# Patient Record
Sex: MALE | Race: ASIAN | NOT HISPANIC OR LATINO | URBAN - METROPOLITAN AREA
[De-identification: names, ages, dates, MRNs, and addresses within clinical notes are randomized per-mention and may not be internally consistent; named-entity substitution may affect disease eponyms.]

---

## 2018-03-20 ENCOUNTER — OUTPATIENT (OUTPATIENT)
Dept: OUTPATIENT SERVICES | Facility: HOSPITAL | Age: 78
LOS: 1 days | End: 2018-03-20
Payer: COMMERCIAL

## 2018-03-20 DIAGNOSIS — R06.00 DYSPNEA, UNSPECIFIED: ICD-10-CM

## 2018-03-20 PROCEDURE — A9502: CPT

## 2018-03-20 PROCEDURE — 93017 CV STRESS TEST TRACING ONLY: CPT

## 2018-03-20 PROCEDURE — 78452 HT MUSCLE IMAGE SPECT MULT: CPT

## 2018-06-04 PROBLEM — Z87.09 HISTORY OF DYSPNEA: Status: RESOLVED | Noted: 2018-06-04 | Resolved: 2018-06-04

## 2018-06-04 PROBLEM — C85.90 LYMPHOMA: Status: ACTIVE | Noted: 2018-06-04

## 2018-06-05 ENCOUNTER — APPOINTMENT (OUTPATIENT)
Dept: INTERVENTIONAL RADIOLOGY/VASCULAR | Facility: CLINIC | Age: 78
End: 2018-06-05
Payer: COMMERCIAL

## 2018-06-05 VITALS
WEIGHT: 150 LBS | HEART RATE: 77 BPM | OXYGEN SATURATION: 100 % | HEIGHT: 64 IN | BODY MASS INDEX: 25.61 KG/M2 | DIASTOLIC BLOOD PRESSURE: 61 MMHG | SYSTOLIC BLOOD PRESSURE: 93 MMHG

## 2018-06-05 VITALS — RESPIRATION RATE: 18 BRPM

## 2018-06-05 DIAGNOSIS — Z87.438 PERSONAL HISTORY OF OTHER DISEASES OF MALE GENITAL ORGANS: ICD-10-CM

## 2018-06-05 DIAGNOSIS — Z86.39 PERSONAL HISTORY OF OTHER ENDOCRINE, NUTRITIONAL AND METABOLIC DISEASE: ICD-10-CM

## 2018-06-05 DIAGNOSIS — Z00.00 ENCOUNTER FOR GENERAL ADULT MEDICAL EXAMINATION W/OUT ABNORMAL FINDINGS: ICD-10-CM

## 2018-06-05 DIAGNOSIS — Z87.891 PERSONAL HISTORY OF NICOTINE DEPENDENCE: ICD-10-CM

## 2018-06-05 DIAGNOSIS — Z86.79 PERSONAL HISTORY OF OTHER DISEASES OF THE CIRCULATORY SYSTEM: ICD-10-CM

## 2018-06-05 DIAGNOSIS — Z87.09 PERSONAL HISTORY OF OTHER DISEASES OF THE RESPIRATORY SYSTEM: ICD-10-CM

## 2018-06-05 DIAGNOSIS — C85.90 NON-HODGKIN LYMPHOMA, UNSPECIFIED, UNSPECIFIED SITE: ICD-10-CM

## 2018-06-05 DIAGNOSIS — Z78.9 OTHER SPECIFIED HEALTH STATUS: ICD-10-CM

## 2018-06-05 DIAGNOSIS — K76.9 LIVER DISEASE, UNSPECIFIED: ICD-10-CM

## 2018-06-05 PROCEDURE — 99204 OFFICE O/P NEW MOD 45 MIN: CPT

## 2018-06-05 RX ORDER — APIXABAN 5 MG/1
5 TABLET, FILM COATED ORAL
Refills: 0 | Status: ACTIVE | COMMUNITY

## 2018-06-05 RX ORDER — PREDNISOLONE ACETATE 10 MG/ML
1 SUSPENSION/ DROPS OPHTHALMIC
Refills: 0 | Status: ACTIVE | COMMUNITY

## 2018-06-05 RX ORDER — KETOROLAC TROMETHAMINE 5 MG/ML
0.5 SOLUTION/ DROPS OPHTHALMIC
Refills: 0 | Status: ACTIVE | COMMUNITY

## 2018-06-05 RX ORDER — TAMSULOSIN HYDROCHLORIDE 0.4 MG/1
0.4 CAPSULE ORAL
Refills: 0 | Status: ACTIVE | COMMUNITY

## 2018-06-05 RX ORDER — IBUPROFEN 200 MG
600 CAPSULE ORAL
Refills: 0 | Status: ACTIVE | COMMUNITY

## 2018-06-05 RX ORDER — RAMIPRIL 5 MG/1
5 CAPSULE ORAL
Refills: 0 | Status: ACTIVE | COMMUNITY

## 2018-06-05 RX ORDER — OXYCODONE 5 MG/1
5 TABLET ORAL
Refills: 0 | Status: ACTIVE | COMMUNITY

## 2018-06-05 RX ORDER — METOPROLOL TARTRATE 100 MG/1
100 TABLET, FILM COATED ORAL
Refills: 0 | Status: ACTIVE | COMMUNITY

## 2018-06-05 RX ORDER — LEVOTHYROXINE SODIUM 50 UG/1
50 TABLET ORAL
Refills: 0 | Status: ACTIVE | COMMUNITY

## 2018-06-05 RX ORDER — MORPHINE SULFATE 15 MG/1
15 TABLET ORAL
Refills: 0 | Status: ACTIVE | COMMUNITY

## 2018-06-05 RX ORDER — INSULIN ASPART INJECTION 100 [IU]/ML
100 INJECTION, SOLUTION SUBCUTANEOUS
Refills: 0 | Status: ACTIVE | COMMUNITY

## 2018-06-05 RX ORDER — ENOXAPARIN SODIUM 100 MG/ML
100 INJECTION SUBCUTANEOUS
Refills: 0 | Status: ACTIVE | COMMUNITY

## 2018-06-05 RX ORDER — GABAPENTIN 300 MG/1
300 CAPSULE ORAL
Refills: 0 | Status: ACTIVE | COMMUNITY

## 2018-06-05 RX ORDER — ATORVASTATIN CALCIUM 80 MG/1
80 TABLET, FILM COATED ORAL
Refills: 0 | Status: ACTIVE | COMMUNITY

## 2018-06-06 ENCOUNTER — OUTPATIENT (OUTPATIENT)
Dept: OUTPATIENT SERVICES | Facility: HOSPITAL | Age: 78
LOS: 1 days | End: 2018-06-06

## 2018-06-06 ENCOUNTER — INPATIENT (INPATIENT)
Facility: HOSPITAL | Age: 78
LOS: 6 days | Discharge: HOME HEALTH | End: 2018-06-13
Attending: INTERNAL MEDICINE | Admitting: INTERNAL MEDICINE
Payer: COMMERCIAL

## 2018-06-06 VITALS
HEART RATE: 80 BPM | TEMPERATURE: 98 F | RESPIRATION RATE: 20 BRPM | SYSTOLIC BLOOD PRESSURE: 132 MMHG | OXYGEN SATURATION: 100 % | DIASTOLIC BLOOD PRESSURE: 69 MMHG

## 2018-06-06 DIAGNOSIS — E03.9 HYPOTHYROIDISM, UNSPECIFIED: ICD-10-CM

## 2018-06-06 DIAGNOSIS — I82.90 ACUTE EMBOLISM AND THROMBOSIS OF UNSPECIFIED VEIN: ICD-10-CM

## 2018-06-06 DIAGNOSIS — I10 ESSENTIAL (PRIMARY) HYPERTENSION: ICD-10-CM

## 2018-06-06 DIAGNOSIS — E87.8 OTHER DISORDERS OF ELECTROLYTE AND FLUID BALANCE, NOT ELSEWHERE CLASSIFIED: ICD-10-CM

## 2018-06-06 DIAGNOSIS — K76.9 LIVER DISEASE, UNSPECIFIED: ICD-10-CM

## 2018-06-06 DIAGNOSIS — I25.10 ATHEROSCLEROTIC HEART DISEASE OF NATIVE CORONARY ARTERY WITHOUT ANGINA PECTORIS: ICD-10-CM

## 2018-06-06 DIAGNOSIS — R06.02 SHORTNESS OF BREATH: ICD-10-CM

## 2018-06-06 DIAGNOSIS — R10.9 UNSPECIFIED ABDOMINAL PAIN: ICD-10-CM

## 2018-06-06 DIAGNOSIS — Z29.9 ENCOUNTER FOR PROPHYLACTIC MEASURES, UNSPECIFIED: ICD-10-CM

## 2018-06-06 DIAGNOSIS — E11.9 TYPE 2 DIABETES MELLITUS WITHOUT COMPLICATIONS: ICD-10-CM

## 2018-06-06 DIAGNOSIS — D64.9 ANEMIA, UNSPECIFIED: ICD-10-CM

## 2018-06-06 DIAGNOSIS — C78.7 SECONDARY MALIGNANT NEOPLASM OF LIVER AND INTRAHEPATIC BILE DUCT: ICD-10-CM

## 2018-06-06 LAB
ALBUMIN SERPL ELPH-MCNC: 3.8 G/DL — SIGNIFICANT CHANGE UP (ref 3.3–5)
ALP SERPL-CCNC: 173 U/L — HIGH (ref 40–120)
ALT FLD-CCNC: 36 U/L — SIGNIFICANT CHANGE UP (ref 4–41)
APTT BLD: 26.2 SEC — LOW (ref 27.5–37.4)
AST SERPL-CCNC: 27 U/L — SIGNIFICANT CHANGE UP (ref 4–40)
BASE EXCESS BLDV CALC-SCNC: 3.7 MMOL/L — SIGNIFICANT CHANGE UP
BASOPHILS # BLD AUTO: 0.04 K/UL — SIGNIFICANT CHANGE UP (ref 0–0.2)
BASOPHILS NFR BLD AUTO: 0.3 % — SIGNIFICANT CHANGE UP (ref 0–2)
BILIRUB SERPL-MCNC: 0.6 MG/DL — SIGNIFICANT CHANGE UP (ref 0.2–1.2)
BLOOD GAS VENOUS - CREATININE: 0.92 MG/DL — SIGNIFICANT CHANGE UP (ref 0.5–1.3)
BUN SERPL-MCNC: 29 MG/DL — HIGH (ref 7–23)
BUN SERPL-MCNC: 30 MG/DL — HIGH (ref 7–23)
CALCIUM SERPL-MCNC: 9.7 MG/DL — SIGNIFICANT CHANGE UP (ref 8.4–10.5)
CALCIUM SERPL-MCNC: 9.8 MG/DL — SIGNIFICANT CHANGE UP (ref 8.4–10.5)
CHLORIDE BLDV-SCNC: 95 MMOL/L — LOW (ref 96–108)
CHLORIDE SERPL-SCNC: 87 MMOL/L — LOW (ref 98–107)
CHLORIDE SERPL-SCNC: 88 MMOL/L — LOW (ref 98–107)
CHLORIDE UR-SCNC: 81 MMOL/L — SIGNIFICANT CHANGE UP
CO2 SERPL-SCNC: 26 MMOL/L — SIGNIFICANT CHANGE UP (ref 22–31)
CO2 SERPL-SCNC: 28 MMOL/L — SIGNIFICANT CHANGE UP (ref 22–31)
CREAT ?TM UR-MCNC: 87.28 MG/DL — SIGNIFICANT CHANGE UP
CREAT SERPL-MCNC: 1 MG/DL — SIGNIFICANT CHANGE UP (ref 0.5–1.3)
CREAT SERPL-MCNC: 1.07 MG/DL — SIGNIFICANT CHANGE UP (ref 0.5–1.3)
EOSINOPHIL # BLD AUTO: 0.63 K/UL — HIGH (ref 0–0.5)
EOSINOPHIL NFR BLD AUTO: 4.6 % — SIGNIFICANT CHANGE UP (ref 0–6)
GAS PNL BLDV: 126 MMOL/L — LOW (ref 136–146)
GLUCOSE BLDV-MCNC: 323 — HIGH (ref 70–99)
GLUCOSE SERPL-MCNC: 330 MG/DL — HIGH (ref 70–99)
GLUCOSE SERPL-MCNC: 344 MG/DL — HIGH (ref 70–99)
HCO3 BLDV-SCNC: 27 MMOL/L — SIGNIFICANT CHANGE UP (ref 20–27)
HCT VFR BLD CALC: 35.6 % — LOW (ref 39–50)
HCT VFR BLDV CALC: 37.6 % — LOW (ref 39–51)
HGB BLD-MCNC: 12.2 G/DL — LOW (ref 13–17)
HGB BLDV-MCNC: 12.2 G/DL — LOW (ref 13–17)
IMM GRANULOCYTES # BLD AUTO: 0.07 # — SIGNIFICANT CHANGE UP
IMM GRANULOCYTES NFR BLD AUTO: 0.5 % — SIGNIFICANT CHANGE UP (ref 0–1.5)
INR BLD: 1.18 — HIGH (ref 0.88–1.17)
LACTATE BLDV-MCNC: 1.8 MMOL/L — SIGNIFICANT CHANGE UP (ref 0.5–2)
LIDOCAIN IGE QN: 151.9 U/L — HIGH (ref 7–60)
LYMPHOCYTES # BLD AUTO: 1.89 K/UL — SIGNIFICANT CHANGE UP (ref 1–3.3)
LYMPHOCYTES # BLD AUTO: 13.9 % — SIGNIFICANT CHANGE UP (ref 13–44)
MCHC RBC-ENTMCNC: 27.5 PG — SIGNIFICANT CHANGE UP (ref 27–34)
MCHC RBC-ENTMCNC: 34.3 % — SIGNIFICANT CHANGE UP (ref 32–36)
MCV RBC AUTO: 80.4 FL — SIGNIFICANT CHANGE UP (ref 80–100)
MONOCYTES # BLD AUTO: 1.15 K/UL — HIGH (ref 0–0.9)
MONOCYTES NFR BLD AUTO: 8.4 % — SIGNIFICANT CHANGE UP (ref 2–14)
NEUTROPHILS # BLD AUTO: 9.83 K/UL — HIGH (ref 1.8–7.4)
NEUTROPHILS NFR BLD AUTO: 72.3 % — SIGNIFICANT CHANGE UP (ref 43–77)
NRBC # FLD: 0 — SIGNIFICANT CHANGE UP
OSMOLALITY UR: 711 MOSMO/KG — SIGNIFICANT CHANGE UP (ref 50–1200)
PCO2 BLDV: 45 MMHG — SIGNIFICANT CHANGE UP (ref 41–51)
PH BLDV: 7.41 PH — SIGNIFICANT CHANGE UP (ref 7.32–7.43)
PLATELET # BLD AUTO: 414 K/UL — HIGH (ref 150–400)
PMV BLD: 9.6 FL — SIGNIFICANT CHANGE UP (ref 7–13)
PO2 BLDV: 38 MMHG — SIGNIFICANT CHANGE UP (ref 35–40)
POTASSIUM BLDV-SCNC: 5.7 MMOL/L — HIGH (ref 3.4–4.5)
POTASSIUM SERPL-MCNC: 5.2 MMOL/L — SIGNIFICANT CHANGE UP (ref 3.5–5.3)
POTASSIUM SERPL-MCNC: 5.4 MMOL/L — HIGH (ref 3.5–5.3)
POTASSIUM SERPL-SCNC: 5.2 MMOL/L — SIGNIFICANT CHANGE UP (ref 3.5–5.3)
POTASSIUM SERPL-SCNC: 5.4 MMOL/L — HIGH (ref 3.5–5.3)
POTASSIUM UR-SCNC: 68.7 MMOL/L — SIGNIFICANT CHANGE UP
PROT SERPL-MCNC: 7.9 G/DL — SIGNIFICANT CHANGE UP (ref 6–8.3)
PROTHROM AB SERPL-ACNC: 13.1 SEC — SIGNIFICANT CHANGE UP (ref 9.8–13.1)
RBC # BLD: 4.43 M/UL — SIGNIFICANT CHANGE UP (ref 4.2–5.8)
RBC # FLD: 13.2 % — SIGNIFICANT CHANGE UP (ref 10.3–14.5)
SAO2 % BLDV: 70.4 % — SIGNIFICANT CHANGE UP (ref 60–85)
SODIUM SERPL-SCNC: 128 MMOL/L — LOW (ref 135–145)
SODIUM SERPL-SCNC: 128 MMOL/L — LOW (ref 135–145)
SODIUM UR-SCNC: 97 MMOL/L — SIGNIFICANT CHANGE UP
UUN UR-MCNC: 861.8 MG/DL — SIGNIFICANT CHANGE UP
WBC # BLD: 13.61 K/UL — HIGH (ref 3.8–10.5)
WBC # FLD AUTO: 13.61 K/UL — HIGH (ref 3.8–10.5)

## 2018-06-06 PROCEDURE — 70450 CT HEAD/BRAIN W/O DYE: CPT | Mod: 26

## 2018-06-06 PROCEDURE — 99223 1ST HOSP IP/OBS HIGH 75: CPT

## 2018-06-06 PROCEDURE — 74176 CT ABD & PELVIS W/O CONTRAST: CPT | Mod: 26

## 2018-06-06 RX ORDER — TAMSULOSIN HYDROCHLORIDE 0.4 MG/1
0.4 CAPSULE ORAL AT BEDTIME
Qty: 0 | Refills: 0 | Status: DISCONTINUED | OUTPATIENT
Start: 2018-06-06 | End: 2018-06-13

## 2018-06-06 RX ORDER — DOCUSATE SODIUM 100 MG
100 CAPSULE ORAL THREE TIMES A DAY
Qty: 0 | Refills: 0 | Status: DISCONTINUED | OUTPATIENT
Start: 2018-06-06 | End: 2018-06-13

## 2018-06-06 RX ORDER — SENNA PLUS 8.6 MG/1
2 TABLET ORAL AT BEDTIME
Qty: 0 | Refills: 0 | Status: DISCONTINUED | OUTPATIENT
Start: 2018-06-06 | End: 2018-06-13

## 2018-06-06 RX ORDER — ENOXAPARIN SODIUM 100 MG/ML
70 INJECTION SUBCUTANEOUS EVERY 12 HOURS
Qty: 0 | Refills: 0 | Status: DISCONTINUED | OUTPATIENT
Start: 2018-06-06 | End: 2018-06-07

## 2018-06-06 RX ORDER — OXYCODONE HYDROCHLORIDE 5 MG/1
5 TABLET ORAL EVERY 4 HOURS
Qty: 0 | Refills: 0 | Status: DISCONTINUED | OUTPATIENT
Start: 2018-06-06 | End: 2018-06-06

## 2018-06-06 RX ORDER — INSULIN LISPRO 100/ML
VIAL (ML) SUBCUTANEOUS
Qty: 0 | Refills: 0 | Status: DISCONTINUED | OUTPATIENT
Start: 2018-06-06 | End: 2018-06-12

## 2018-06-06 RX ORDER — MORPHINE SULFATE 50 MG/1
15 CAPSULE, EXTENDED RELEASE ORAL
Qty: 0 | Refills: 0 | Status: DISCONTINUED | OUTPATIENT
Start: 2018-06-06 | End: 2018-06-10

## 2018-06-06 RX ORDER — SODIUM CHLORIDE 9 MG/ML
1000 INJECTION, SOLUTION INTRAVENOUS
Qty: 0 | Refills: 0 | Status: DISCONTINUED | OUTPATIENT
Start: 2018-06-06 | End: 2018-06-13

## 2018-06-06 RX ORDER — INSULIN LISPRO 100/ML
4 VIAL (ML) SUBCUTANEOUS
Qty: 0 | Refills: 0 | Status: DISCONTINUED | OUTPATIENT
Start: 2018-06-06 | End: 2018-06-10

## 2018-06-06 RX ORDER — MORPHINE SULFATE 50 MG/1
4 CAPSULE, EXTENDED RELEASE ORAL ONCE
Qty: 0 | Refills: 0 | Status: DISCONTINUED | OUTPATIENT
Start: 2018-06-06 | End: 2018-06-06

## 2018-06-06 RX ORDER — ATORVASTATIN CALCIUM 80 MG/1
40 TABLET, FILM COATED ORAL AT BEDTIME
Qty: 0 | Refills: 0 | Status: DISCONTINUED | OUTPATIENT
Start: 2018-06-06 | End: 2018-06-13

## 2018-06-06 RX ORDER — INSULIN GLARGINE 100 [IU]/ML
45 INJECTION, SOLUTION SUBCUTANEOUS AT BEDTIME
Qty: 0 | Refills: 0 | Status: DISCONTINUED | OUTPATIENT
Start: 2018-06-06 | End: 2018-06-10

## 2018-06-06 RX ORDER — SODIUM CHLORIDE 9 MG/ML
1000 INJECTION INTRAMUSCULAR; INTRAVENOUS; SUBCUTANEOUS ONCE
Qty: 0 | Refills: 0 | Status: COMPLETED | OUTPATIENT
Start: 2018-06-06 | End: 2018-06-06

## 2018-06-06 RX ORDER — DEXTROSE 50 % IN WATER 50 %
25 SYRINGE (ML) INTRAVENOUS ONCE
Qty: 0 | Refills: 0 | Status: DISCONTINUED | OUTPATIENT
Start: 2018-06-06 | End: 2018-06-10

## 2018-06-06 RX ORDER — GLUCAGON INJECTION, SOLUTION 0.5 MG/.1ML
1 INJECTION, SOLUTION SUBCUTANEOUS ONCE
Qty: 0 | Refills: 0 | Status: DISCONTINUED | OUTPATIENT
Start: 2018-06-06 | End: 2018-06-13

## 2018-06-06 RX ORDER — DEXTROSE 50 % IN WATER 50 %
12.5 SYRINGE (ML) INTRAVENOUS ONCE
Qty: 0 | Refills: 0 | Status: DISCONTINUED | OUTPATIENT
Start: 2018-06-06 | End: 2018-06-13

## 2018-06-06 RX ORDER — HYDROMORPHONE HYDROCHLORIDE 2 MG/ML
1 INJECTION INTRAMUSCULAR; INTRAVENOUS; SUBCUTANEOUS ONCE
Qty: 0 | Refills: 0 | Status: DISCONTINUED | OUTPATIENT
Start: 2018-06-06 | End: 2018-06-06

## 2018-06-06 RX ORDER — MORPHINE SULFATE 50 MG/1
4 CAPSULE, EXTENDED RELEASE ORAL EVERY 4 HOURS
Qty: 0 | Refills: 0 | Status: DISCONTINUED | OUTPATIENT
Start: 2018-06-06 | End: 2018-06-10

## 2018-06-06 RX ORDER — LISINOPRIL 2.5 MG/1
20 TABLET ORAL DAILY
Qty: 0 | Refills: 0 | Status: DISCONTINUED | OUTPATIENT
Start: 2018-06-06 | End: 2018-06-07

## 2018-06-06 RX ORDER — INSULIN LISPRO 100/ML
5 VIAL (ML) SUBCUTANEOUS ONCE
Qty: 0 | Refills: 0 | Status: COMPLETED | OUTPATIENT
Start: 2018-06-06 | End: 2018-06-06

## 2018-06-06 RX ORDER — METOPROLOL TARTRATE 50 MG
200 TABLET ORAL DAILY
Qty: 0 | Refills: 0 | Status: DISCONTINUED | OUTPATIENT
Start: 2018-06-06 | End: 2018-06-07

## 2018-06-06 RX ORDER — DEXTROSE 50 % IN WATER 50 %
15 SYRINGE (ML) INTRAVENOUS ONCE
Qty: 0 | Refills: 0 | Status: DISCONTINUED | OUTPATIENT
Start: 2018-06-06 | End: 2018-06-13

## 2018-06-06 RX ORDER — LEVOTHYROXINE SODIUM 125 MCG
50 TABLET ORAL DAILY
Qty: 0 | Refills: 0 | Status: DISCONTINUED | OUTPATIENT
Start: 2018-06-06 | End: 2018-06-08

## 2018-06-06 RX ORDER — DEXTROSE 50 % IN WATER 50 %
25 SYRINGE (ML) INTRAVENOUS ONCE
Qty: 0 | Refills: 0 | Status: DISCONTINUED | OUTPATIENT
Start: 2018-06-06 | End: 2018-06-13

## 2018-06-06 RX ORDER — OXYCODONE HYDROCHLORIDE 5 MG/1
5 TABLET ORAL EVERY 4 HOURS
Qty: 0 | Refills: 0 | Status: DISCONTINUED | OUTPATIENT
Start: 2018-06-06 | End: 2018-06-10

## 2018-06-06 RX ADMIN — MORPHINE SULFATE 15 MILLIGRAM(S): 50 CAPSULE, EXTENDED RELEASE ORAL at 17:27

## 2018-06-06 RX ADMIN — TAMSULOSIN HYDROCHLORIDE 0.4 MILLIGRAM(S): 0.4 CAPSULE ORAL at 21:58

## 2018-06-06 RX ADMIN — MORPHINE SULFATE 4 MILLIGRAM(S): 50 CAPSULE, EXTENDED RELEASE ORAL at 22:02

## 2018-06-06 RX ADMIN — ATORVASTATIN CALCIUM 40 MILLIGRAM(S): 80 TABLET, FILM COATED ORAL at 21:54

## 2018-06-06 RX ADMIN — Medication 4 UNIT(S): at 18:17

## 2018-06-06 RX ADMIN — SENNA PLUS 2 TABLET(S): 8.6 TABLET ORAL at 21:51

## 2018-06-06 RX ADMIN — LISINOPRIL 20 MILLIGRAM(S): 2.5 TABLET ORAL at 21:51

## 2018-06-06 RX ADMIN — MORPHINE SULFATE 4 MILLIGRAM(S): 50 CAPSULE, EXTENDED RELEASE ORAL at 21:47

## 2018-06-06 RX ADMIN — MORPHINE SULFATE 4 MILLIGRAM(S): 50 CAPSULE, EXTENDED RELEASE ORAL at 13:27

## 2018-06-06 RX ADMIN — ENOXAPARIN SODIUM 70 MILLIGRAM(S): 100 INJECTION SUBCUTANEOUS at 17:27

## 2018-06-06 RX ADMIN — SODIUM CHLORIDE 2000 MILLILITER(S): 9 INJECTION INTRAMUSCULAR; INTRAVENOUS; SUBCUTANEOUS at 10:35

## 2018-06-06 RX ADMIN — HYDROMORPHONE HYDROCHLORIDE 1 MILLIGRAM(S): 2 INJECTION INTRAMUSCULAR; INTRAVENOUS; SUBCUTANEOUS at 14:00

## 2018-06-06 RX ADMIN — Medication 5 UNIT(S): at 14:28

## 2018-06-06 RX ADMIN — Medication 100 MILLIGRAM(S): at 21:51

## 2018-06-06 RX ADMIN — MORPHINE SULFATE 15 MILLIGRAM(S): 50 CAPSULE, EXTENDED RELEASE ORAL at 18:27

## 2018-06-06 RX ADMIN — INSULIN GLARGINE 45 UNIT(S): 100 INJECTION, SOLUTION SUBCUTANEOUS at 22:29

## 2018-06-06 RX ADMIN — Medication 2: at 18:17

## 2018-06-06 RX ADMIN — MORPHINE SULFATE 4 MILLIGRAM(S): 50 CAPSULE, EXTENDED RELEASE ORAL at 10:35

## 2018-06-06 NOTE — H&P ADULT - PROBLEM SELECTOR PLAN 7
- Check A1C in AM  - Pt started on 45 Lantus and 4 premeal.   - Monitor FS premeal and at bedtime and cover with ISS.  - Carb consistent diet.

## 2018-06-06 NOTE — H&P ADULT - NSHPREVIEWOFSYSTEMS_GEN_ALL_CORE
Review of Systems:   CONSTITUTIONAL: No fever, + weight loss, + fatigue  EYES: No eye pain, visual disturbances, or discharge  ENMT:  No difficulty hearing, tinnitus, vertigo; No sinus or throat pain  NECK: No pain or stiffness  RESPIRATORY: No cough, wheezing, chills or hemoptysis; + shortness of breath  CARDIOVASCULAR: No chest pain, palpitations, dizziness, or leg swelling  GASTROINTESTINAL: + abdominal and epigastric pain. No nausea, vomiting, or hematemesis; No diarrhea. + constipation. No melena or hematochezia.  GENITOURINARY: No dysuria, frequency, hematuria, or incontinence  NEUROLOGICAL: No headaches, memory loss, loss of strength, numbness, or tremors  SKIN: No itching, burning, rashes, or lesions   LYMPH NODES: No enlarged glands  MUSCULOSKELETAL: No joint pain or swelling; + back pain  PSYCHIATRIC: No depression, anxiety, mood swings, or difficulty sleeping  HEME/LYMPH: No easy bruising, or bleeding gums

## 2018-06-06 NOTE — H&P ADULT - ASSESSMENT
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 He has a recent diagnosis of diffusely metastatic disease that is currently undergoing work up with a plan for treatment. He is admitted to the hospital for weakness and electrolyte abnormalities.

## 2018-06-06 NOTE — H&P ADULT - PROBLEM SELECTOR PLAN 3
- Likely anemia of chronic disease, but may also be blood loss anemia if primary CA is colon.   - check iron studies in AM.   - Monitor CBC daily.

## 2018-06-06 NOTE — ED PROVIDER NOTE - MEDICAL DECISION MAKING DETAILS
79 y/o male presenting to ED from IR/rad/onc for abnormal labs & sob/abdominal pain  -repeat labs (cbc cmp lipase vbg)  -pain control  -probable admission

## 2018-06-06 NOTE — H&P ADULT - PROBLEM SELECTOR PLAN 1
- Hyponatremia and hyperkalemia.   - Unclear etiology at this time. It could be related to adrenal mass (however less likely given it is not bilateral.) Could also be related to poor PO intake and dehydration in the setting of being NPO for biopsy  - Will send of urine Na, K, Cl, Creatinine, and osm.   - restart diet, and hold off on IVF for now.   - Daily BMP.   - AM cortisol level.   - AM TSH

## 2018-06-06 NOTE — ED ADULT NURSE NOTE - PMH
CAD (coronary artery disease) s/p 4V-CABG (2010)    Diabetes mellitus type II    Hyperlipidemia    Hypertension    Liver cancer

## 2018-06-06 NOTE — H&P ADULT - NSHPLABSRESULTS_GEN_ALL_CORE
12.2 )-----------( 414      ( 06 Jun 2018 10:15 )             35.6     06-06    128<L>  |  87<L>  |  29<H>  ----------------------------<  330<H>  5.2   |  26  |  1.00    Ca    9.8      06 Jun 2018 10:15    TPro  7.9  /  Alb  3.8  /  TBili  0.6  /  DBili  x   /  AST  27  /  ALT  36  /  AlkPhos  173<H>  06-06    PT/INR - ( 06 Jun 2018 07:50 )   PT: 13.1 SEC;   INR: 1.18          PTT - ( 06 Jun 2018 07:50 )  PTT:26.2 SEC      < from: CT Head No Cont (06.06.18 @ 12:52) >    Microvasculardisease.  No acute intracranial hemorrhage.    < end of copied text >    < from: CT Abdomen and Pelvis No Cont (06.06.18 @ 12:45) >    IMPRESSION:  Metastatic disease involving the liver and the lung bases. Peritoneal   carcinomatosis. Heterogeneous mass involving the gastric fundus,   pancreatic body/tail, left adrenal gland, and the splenic hilum.    < end of copied text >

## 2018-06-06 NOTE — ED ADULT NURSE NOTE - OBJECTIVE STATEMENT
pt comes to ed from IR for abnormal labwork prior to proceedure  family at side vss pt denies pain at this time comes with iv 22g right wrist from ir  labs sent normal saline bolus in progress and ms for pain given  cm rsr ed md roberson in progress will continue to monitor  ekg done

## 2018-06-06 NOTE — H&P ADULT - PROBLEM SELECTOR PLAN 2
- Likely multifactorial in the setting of metastatic disease with pleural effusions, deconditioning, and anemia. Pt also may not be taking deep breath in the setting of abd mets.   - Order incentive spirometer.   - Supplemental O2 if needed.   - PT

## 2018-06-06 NOTE — ED PROVIDER NOTE - ATTENDING CONTRIBUTION TO CARE
Attending note:   After face to face evaluation of this patient, I concur with above noted hx, pe, and care plan for this patient.  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   Recently on eloquis with fall and head trauma yesterday.  Negative workup for PE within last 10 days.  VSS.   +LUQ pain on exam.   Evaluation in progress

## 2018-06-06 NOTE — ED ADULT TRIAGE NOTE - CHIEF COMPLAINT QUOTE
Pt brought down from IR. Pt found to have abnormal blood book . Pt reports sob x 3 week. Pt with liver/spleen /pancrease mets.

## 2018-06-06 NOTE — H&P ADULT - NSHPOUTPATIENTPROVIDERS_GEN_ALL_CORE
Celina Castañeda (PMD)  Maykel Medina (Onc)  Temo Silvestre (GI)  Megan Gomez (cardio)  Alber Rome (Pulm)

## 2018-06-06 NOTE — H&P ADULT - HISTORY OF PRESENT ILLNESS
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     The patient initial symptoms started in Feb 2018. He states that during this time he started to have weakness and SOB. At first it did not bother him, but it progressively worsened to the point where he was not able to walk even short distances without significant SOB. He underwent cardiac workup with TTE and nuclear ST which were normal. He then underwent pulmonary eval with PFTs, which were also normal. He eventually went to see his oncologist in May who performed a D-Dimer that was elevated. Because of this he was told that he needs to go into the ED. So in May of 2018 he went to [facility] where he underwent CTA. It was negative for PE, but it did find that the patient had multiple liver lesions consistent with metastatic disease. 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     The patient initial symptoms started in Feb 2018. He states that during this time he started to have weakness and SOB. At first it did not bother him, but it progressively worsened to the point where he was not able to walk even short distances without significant SOB. He underwent cardiac workup with TTE and nuclear ST which were normal. He then underwent pulmonary eval with PFTs, which were also normal. He eventually went to see his oncologist in May who performed a D-Dimer that was elevated. Because of this he was told that he needs to go into the ED. So in May of 2018 he went to [facility] where he underwent CTA. It was negative for PE, but it did find that the patient had multiple liver lesions consistent with metastatic disease. This was then followed by a MRI of the abd which showed: pancreatic tail mass, left adrenal gland mass, liver metastasis and a splenic vein thrombosis. He was eventually discharged from the hospital on Lovenox with a plan to follow up with his oncologist and have IR biopsy of the liver mets. The patient was scheduled for a biopsy the day of admission, but because of his abnormal lab work the biopsy was differed and the patient was admitted. Of note, the patient does state that since getting sick he has developed progressive weakness, loss of appetite, weight loss, and fatigue. He also admits to having a fainting episode the day prior to admission. He does not recall hitting his head, but does state that he hurt his fingers when he tried to catch himself.     In addition the patient also complains of constipation since being started on morphine for pain. He also admits to having pain in his back and stomach.     In the ED the patient vitals were stable and he received morphine and Dilaudid for pain.

## 2018-06-06 NOTE — ED PROVIDER NOTE - PMH
CAD (coronary artery disease) s/p 4V-CABG (2010)    Diabetes mellitus type II    Hyperlipidemia    Hypertension    Liver cancer CAD (coronary artery disease) s/p 4V-CABG (2010)    Diabetes mellitus type II    Hyperlipidemia    Hypertension    Liver cancer    Pancreatic cancer

## 2018-06-06 NOTE — ED PROVIDER NOTE - OBJECTIVE STATEMENT
79 y/o male hx HTN DM Lymphoma 2012, Liver CA (recently diagnoses) w/ mets to pancreas/adrenals presents to ED for abnormal labs. Pt. was at IR today for liver biopsy - had lab work drawn: K+ 5.4 Na++ 128, sent down from IR/rad onc for further evaluation of abnormal lab values as well as patient complaints of sob and abdominal pain. Son states he was sent down to ER by Dr. Lennon (IR/radon) for abnormal lab work/admission. As per son who is aiding in history - patient has been c/o worsening abdominal pain and shortness of breath for the past few months - recently admitted to [facility] 2 weeks ago for sob/abd pain - had negative work up including ctpa, mri abd and dc. Pt. still c/o worsening shortness of breath (worse with exertion) and diffuse abdominal pain radiating to back neck and shoulders. Taking po oxycodone at home with minimal relief. Also c/o intermittent dizziness over past 2-3 days with one fall in bathroom 2 days ago. Denies fever chills loc. 77 y/o male hx HTN DM Lymphoma 2012, recent diagnosis of pancreatic/liver/adrenal CA presents to ED for abnormal labs. Pt. was at IR today for liver biopsy - had lab work drawn: K+ 5.4 Na++ 128, sent down from IR/rad onc for further evaluation of abnormal lab values as well as patient complaints of sob and abdominal pain. Son states he was sent down to ER by Dr. Lennon (IR/radon) for abnormal lab work/admission. As per son who is aiding in history - patient has been c/o worsening abdominal pain and shortness of breath for the past few months - recently admitted to [facility] 2 weeks ago for sob/abd pain - had negative work up including ctpa, mri abd and dc. Pt. still c/o worsening shortness of breath (worse with exertion) and diffuse abdominal pain radiating to back neck and shoulders. Taking po oxycodone at home with minimal relief. Also c/o intermittent dizziness over past 2-3 days with one fall in bathroom 2 days ago. Denies fever chills loc.

## 2018-06-06 NOTE — ED PROVIDER NOTE - CARE PLAN
Principal Discharge DX:	Abdominal pain  Assessment and plan of treatment:	admit for pain control, cancer work up and liver biopsy

## 2018-06-06 NOTE — H&P ADULT - PMH
CAD (coronary artery disease) s/p 4V-CABG (2010)    Diabetes mellitus type II    Hyperlipidemia    Hypertension    Liver cancer    Pancreatic cancer

## 2018-06-06 NOTE — H&P ADULT - PROBLEM SELECTOR PLAN 6
- restart pts home BP meds: ACEi and metoprolol.  - If patient is persistently hyperkalemic will d/c ACEi  - Low salt diet.

## 2018-06-06 NOTE — H&P ADULT - PROBLEM SELECTOR PLAN 4
- Splenic vein thrombosis.   - Pt was on Eliquis at home, but was hold it the past two days in preparation for biopsy.   - At this time will start treatment dose of lovenox with a plan to start holding once pt is cleared for biopsy.   - Monitor for signs of bleeding.

## 2018-06-07 LAB
ALBUMIN SERPL ELPH-MCNC: 3.4 G/DL — SIGNIFICANT CHANGE UP (ref 3.3–5)
ALP SERPL-CCNC: 147 U/L — HIGH (ref 40–120)
ALT FLD-CCNC: 30 U/L — SIGNIFICANT CHANGE UP (ref 4–41)
APTT BLD: 25.5 SEC — LOW (ref 27.5–37.4)
AST SERPL-CCNC: 28 U/L — SIGNIFICANT CHANGE UP (ref 4–40)
BASOPHILS # BLD AUTO: 0.04 K/UL — SIGNIFICANT CHANGE UP (ref 0–0.2)
BASOPHILS NFR BLD AUTO: 0.3 % — SIGNIFICANT CHANGE UP (ref 0–2)
BILIRUB SERPL-MCNC: 0.6 MG/DL — SIGNIFICANT CHANGE UP (ref 0.2–1.2)
BUN SERPL-MCNC: 19 MG/DL — SIGNIFICANT CHANGE UP (ref 7–23)
CALCIUM SERPL-MCNC: 9 MG/DL — SIGNIFICANT CHANGE UP (ref 8.4–10.5)
CALCIUM SERPL-MCNC: 9.2 MG/DL — SIGNIFICANT CHANGE UP (ref 8.4–10.5)
CALCIUM SERPL-MCNC: 9.7 MG/DL — SIGNIFICANT CHANGE UP (ref 8.4–10.5)
CANCER AG19-9 SERPL-ACNC: 5368.7 U/ML — HIGH
CEA SERPL-MCNC: 745 NG/ML — HIGH (ref 1–3.8)
CHLORIDE SERPL-SCNC: 91 MMOL/L — LOW (ref 98–107)
CHLORIDE SERPL-SCNC: 93 MMOL/L — LOW (ref 98–107)
CHLORIDE SERPL-SCNC: 94 MMOL/L — LOW (ref 98–107)
CO2 SERPL-SCNC: 25 MMOL/L — SIGNIFICANT CHANGE UP (ref 22–31)
CO2 SERPL-SCNC: 27 MMOL/L — SIGNIFICANT CHANGE UP (ref 22–31)
CO2 SERPL-SCNC: 27 MMOL/L — SIGNIFICANT CHANGE UP (ref 22–31)
CORTIS SERPL-MCNC: 25.7 UG/DL — HIGH (ref 2.7–18.4)
CREAT SERPL-MCNC: 0.85 MG/DL — SIGNIFICANT CHANGE UP (ref 0.5–1.3)
CREAT SERPL-MCNC: 0.87 MG/DL — SIGNIFICANT CHANGE UP (ref 0.5–1.3)
CREAT SERPL-MCNC: 0.93 MG/DL — SIGNIFICANT CHANGE UP (ref 0.5–1.3)
EOSINOPHIL # BLD AUTO: 0.98 K/UL — HIGH (ref 0–0.5)
EOSINOPHIL NFR BLD AUTO: 7.5 % — HIGH (ref 0–6)
FERRITIN SERPL-MCNC: 552.3 NG/ML — HIGH (ref 30–400)
GLUCOSE SERPL-MCNC: 129 MG/DL — HIGH (ref 70–99)
GLUCOSE SERPL-MCNC: 131 MG/DL — HIGH (ref 70–99)
GLUCOSE SERPL-MCNC: 202 MG/DL — HIGH (ref 70–99)
HAV IGM SER-ACNC: NONREACTIVE — SIGNIFICANT CHANGE UP
HBA1C BLD-MCNC: 12.6 % — HIGH (ref 4–5.6)
HBV CORE IGM SER-ACNC: NONREACTIVE — SIGNIFICANT CHANGE UP
HBV SURFACE AG SER-ACNC: NONREACTIVE — SIGNIFICANT CHANGE UP
HCT VFR BLD CALC: 34.8 % — LOW (ref 39–50)
HCV AB S/CO SERPL IA: 0.1 S/CO — SIGNIFICANT CHANGE UP
HCV AB SERPL-IMP: SIGNIFICANT CHANGE UP
HGB BLD-MCNC: 11.4 G/DL — LOW (ref 13–17)
IMM GRANULOCYTES # BLD AUTO: 0.06 # — SIGNIFICANT CHANGE UP
IMM GRANULOCYTES NFR BLD AUTO: 0.5 % — SIGNIFICANT CHANGE UP (ref 0–1.5)
INR BLD: 1.19 — HIGH (ref 0.88–1.17)
IRON SATN MFR SERPL: 252 UG/DL — SIGNIFICANT CHANGE UP (ref 155–535)
IRON SATN MFR SERPL: 32 UG/DL — LOW (ref 45–165)
LYMPHOCYTES # BLD AUTO: 16.9 % — SIGNIFICANT CHANGE UP (ref 13–44)
LYMPHOCYTES # BLD AUTO: 2.21 K/UL — SIGNIFICANT CHANGE UP (ref 1–3.3)
MAGNESIUM SERPL-MCNC: 2.1 MG/DL — SIGNIFICANT CHANGE UP (ref 1.6–2.6)
MAGNESIUM SERPL-MCNC: 2.1 MG/DL — SIGNIFICANT CHANGE UP (ref 1.6–2.6)
MAGNESIUM SERPL-MCNC: 2.3 MG/DL — SIGNIFICANT CHANGE UP (ref 1.6–2.6)
MCHC RBC-ENTMCNC: 27.8 PG — SIGNIFICANT CHANGE UP (ref 27–34)
MCHC RBC-ENTMCNC: 32.8 % — SIGNIFICANT CHANGE UP (ref 32–36)
MCV RBC AUTO: 84.9 FL — SIGNIFICANT CHANGE UP (ref 80–100)
MONOCYTES # BLD AUTO: 1.33 K/UL — HIGH (ref 0–0.9)
MONOCYTES NFR BLD AUTO: 10.2 % — SIGNIFICANT CHANGE UP (ref 2–14)
NEUTROPHILS # BLD AUTO: 8.43 K/UL — HIGH (ref 1.8–7.4)
NEUTROPHILS NFR BLD AUTO: 64.6 % — SIGNIFICANT CHANGE UP (ref 43–77)
NRBC # FLD: 0 — SIGNIFICANT CHANGE UP
PHOSPHATE SERPL-MCNC: 3.7 MG/DL — SIGNIFICANT CHANGE UP (ref 2.5–4.5)
PHOSPHATE SERPL-MCNC: 4.3 MG/DL — SIGNIFICANT CHANGE UP (ref 2.5–4.5)
PHOSPHATE SERPL-MCNC: 4.4 MG/DL — SIGNIFICANT CHANGE UP (ref 2.5–4.5)
PLATELET # BLD AUTO: 392 K/UL — SIGNIFICANT CHANGE UP (ref 150–400)
PMV BLD: 9.6 FL — SIGNIFICANT CHANGE UP (ref 7–13)
POTASSIUM SERPL-MCNC: 4.5 MMOL/L — SIGNIFICANT CHANGE UP (ref 3.5–5.3)
POTASSIUM SERPL-MCNC: 4.7 MMOL/L — SIGNIFICANT CHANGE UP (ref 3.5–5.3)
POTASSIUM SERPL-MCNC: 5 MMOL/L — SIGNIFICANT CHANGE UP (ref 3.5–5.3)
POTASSIUM SERPL-SCNC: 4.5 MMOL/L — SIGNIFICANT CHANGE UP (ref 3.5–5.3)
POTASSIUM SERPL-SCNC: 4.7 MMOL/L — SIGNIFICANT CHANGE UP (ref 3.5–5.3)
POTASSIUM SERPL-SCNC: 5 MMOL/L — SIGNIFICANT CHANGE UP (ref 3.5–5.3)
PROT SERPL-MCNC: 7.5 G/DL — SIGNIFICANT CHANGE UP (ref 6–8.3)
PROTHROM AB SERPL-ACNC: 13.2 SEC — HIGH (ref 9.8–13.1)
RBC # BLD: 4.1 M/UL — LOW (ref 4.2–5.8)
RBC # FLD: 13.2 % — SIGNIFICANT CHANGE UP (ref 10.3–14.5)
SODIUM SERPL-SCNC: 130 MMOL/L — LOW (ref 135–145)
SODIUM SERPL-SCNC: 132 MMOL/L — LOW (ref 135–145)
SODIUM SERPL-SCNC: 135 MMOL/L — SIGNIFICANT CHANGE UP (ref 135–145)
T3 SERPL-MCNC: 68.3 NG/DL — LOW (ref 80–200)
T4 AB SER-ACNC: 6.72 UG/DL — SIGNIFICANT CHANGE UP (ref 5.1–13)
TSH SERPL-MCNC: 20.03 UIU/ML — HIGH (ref 0.27–4.2)
UIBC SERPL-MCNC: 219.6 UG/DL — SIGNIFICANT CHANGE UP (ref 110–370)
WBC # BLD: 13.05 K/UL — HIGH (ref 3.8–10.5)
WBC # FLD AUTO: 13.05 K/UL — HIGH (ref 3.8–10.5)

## 2018-06-07 PROCEDURE — 99233 SBSQ HOSP IP/OBS HIGH 50: CPT

## 2018-06-07 PROCEDURE — 71045 X-RAY EXAM CHEST 1 VIEW: CPT | Mod: 26

## 2018-06-07 RX ORDER — POLYETHYLENE GLYCOL 3350 17 G/17G
17 POWDER, FOR SOLUTION ORAL
Qty: 0 | Refills: 0 | Status: DISCONTINUED | OUTPATIENT
Start: 2018-06-07 | End: 2018-06-13

## 2018-06-07 RX ORDER — SODIUM CHLORIDE 9 MG/ML
1000 INJECTION INTRAMUSCULAR; INTRAVENOUS; SUBCUTANEOUS
Qty: 0 | Refills: 0 | Status: DISCONTINUED | OUTPATIENT
Start: 2018-06-07 | End: 2018-06-08

## 2018-06-07 RX ORDER — METOPROLOL TARTRATE 50 MG
100 TABLET ORAL
Qty: 0 | Refills: 0 | Status: DISCONTINUED | OUTPATIENT
Start: 2018-06-07 | End: 2018-06-13

## 2018-06-07 RX ORDER — SODIUM CHLORIDE 9 MG/ML
500 INJECTION, SOLUTION INTRAVENOUS ONCE
Qty: 0 | Refills: 0 | Status: COMPLETED | OUTPATIENT
Start: 2018-06-07 | End: 2018-06-07

## 2018-06-07 RX ADMIN — Medication 100 MILLIGRAM(S): at 17:41

## 2018-06-07 RX ADMIN — Medication 4 UNIT(S): at 09:14

## 2018-06-07 RX ADMIN — MORPHINE SULFATE 15 MILLIGRAM(S): 50 CAPSULE, EXTENDED RELEASE ORAL at 17:41

## 2018-06-07 RX ADMIN — LISINOPRIL 20 MILLIGRAM(S): 2.5 TABLET ORAL at 06:17

## 2018-06-07 RX ADMIN — Medication 100 MILLIGRAM(S): at 06:17

## 2018-06-07 RX ADMIN — Medication 100 MILLIGRAM(S): at 13:14

## 2018-06-07 RX ADMIN — MORPHINE SULFATE 4 MILLIGRAM(S): 50 CAPSULE, EXTENDED RELEASE ORAL at 04:15

## 2018-06-07 RX ADMIN — Medication 200 MILLIGRAM(S): at 06:17

## 2018-06-07 RX ADMIN — TAMSULOSIN HYDROCHLORIDE 0.4 MILLIGRAM(S): 0.4 CAPSULE ORAL at 22:13

## 2018-06-07 RX ADMIN — SODIUM CHLORIDE 1000 MILLILITER(S): 9 INJECTION, SOLUTION INTRAVENOUS at 10:50

## 2018-06-07 RX ADMIN — ENOXAPARIN SODIUM 70 MILLIGRAM(S): 100 INJECTION SUBCUTANEOUS at 06:16

## 2018-06-07 RX ADMIN — SODIUM CHLORIDE 75 MILLILITER(S): 9 INJECTION INTRAMUSCULAR; INTRAVENOUS; SUBCUTANEOUS at 21:02

## 2018-06-07 RX ADMIN — MORPHINE SULFATE 4 MILLIGRAM(S): 50 CAPSULE, EXTENDED RELEASE ORAL at 22:03

## 2018-06-07 RX ADMIN — ATORVASTATIN CALCIUM 40 MILLIGRAM(S): 80 TABLET, FILM COATED ORAL at 22:03

## 2018-06-07 RX ADMIN — SENNA PLUS 2 TABLET(S): 8.6 TABLET ORAL at 22:03

## 2018-06-07 RX ADMIN — MORPHINE SULFATE 4 MILLIGRAM(S): 50 CAPSULE, EXTENDED RELEASE ORAL at 04:30

## 2018-06-07 RX ADMIN — Medication 1: at 13:13

## 2018-06-07 RX ADMIN — MORPHINE SULFATE 4 MILLIGRAM(S): 50 CAPSULE, EXTENDED RELEASE ORAL at 10:48

## 2018-06-07 RX ADMIN — Medication 2: at 09:14

## 2018-06-07 RX ADMIN — INSULIN GLARGINE 45 UNIT(S): 100 INJECTION, SOLUTION SUBCUTANEOUS at 21:34

## 2018-06-07 RX ADMIN — MORPHINE SULFATE 15 MILLIGRAM(S): 50 CAPSULE, EXTENDED RELEASE ORAL at 07:15

## 2018-06-07 RX ADMIN — MORPHINE SULFATE 15 MILLIGRAM(S): 50 CAPSULE, EXTENDED RELEASE ORAL at 06:19

## 2018-06-07 RX ADMIN — MORPHINE SULFATE 4 MILLIGRAM(S): 50 CAPSULE, EXTENDED RELEASE ORAL at 22:18

## 2018-06-07 RX ADMIN — SODIUM CHLORIDE 75 MILLILITER(S): 9 INJECTION INTRAMUSCULAR; INTRAVENOUS; SUBCUTANEOUS at 12:34

## 2018-06-07 RX ADMIN — Medication 100 MILLIGRAM(S): at 22:03

## 2018-06-07 RX ADMIN — MORPHINE SULFATE 4 MILLIGRAM(S): 50 CAPSULE, EXTENDED RELEASE ORAL at 11:04

## 2018-06-07 RX ADMIN — POLYETHYLENE GLYCOL 3350 17 GRAM(S): 17 POWDER, FOR SOLUTION ORAL at 17:41

## 2018-06-07 RX ADMIN — Medication 50 MICROGRAM(S): at 06:17

## 2018-06-07 RX ADMIN — MORPHINE SULFATE 15 MILLIGRAM(S): 50 CAPSULE, EXTENDED RELEASE ORAL at 18:41

## 2018-06-07 RX ADMIN — Medication 4 UNIT(S): at 18:18

## 2018-06-07 RX ADMIN — Medication 4 UNIT(S): at 13:14

## 2018-06-07 NOTE — PHYSICAL THERAPY INITIAL EVALUATION ADULT - PERTINENT HX OF CURRENT PROBLEM, REHAB EVAL
This is a 79 y/o M with recent diagnosis of diffusely metastatic disease that is currently undergoing work up with a plan for treatment. He is admitted to the hospital for weakness and electrolyte abnormalities.

## 2018-06-07 NOTE — PROGRESS NOTE ADULT - PROBLEM SELECTOR PLAN 1
- Hyponatremia and hyperkalemia.   - Unclear etiology at this time.       - Possible etiologies         -  adrenal mass (however less likely given it is not bilateral.)          - poor PO intake and dehydration  - f/u urine Na, K, Cl, Creatinine, and osm.   - restart diet, and hold off on IVF for now.   - Daily BMP.   - AM cortisol level.   - AM TSH - Hyponatremia and hyperkalemia.   - Unclear etiology at this time.       - Possible etiologies         -  adrenal mass (however less likely given it is not bilateral.)          - poor PO intake and dehydration  - f/u urine Na, K, Cl, Creatinine, and osm.   - restart diet, and hold off on IVF for now.   - Daily BMP.   - AM Cortisol elevated  - TSH high, will check T3 and T4

## 2018-06-07 NOTE — PROGRESS NOTE ADULT - PROBLEM SELECTOR PLAN 5
- GOC had with pt. He would like to undergo treatment for whatever cancer this maybe. He would also like to be full code at this time.   - Plan to undergo liver biopsy once electrolytes are normalized.  - Pain control with mophine ER and oxycodone at home doses with bowl regimen.

## 2018-06-07 NOTE — PHYSICAL THERAPY INITIAL EVALUATION ADULT - GENERAL OBSERVATIONS, REHAB EVAL
Patient received semi supine in bed ,c/o dizziness, BP in lying 124/72 HR 85 sitting /59 SpO2 100% HR 88 BP standing 76/45 HR 94,RN Prerna present and is made aware son at bedside.

## 2018-06-07 NOTE — PROGRESS NOTE ADULT - SUBJECTIVE AND OBJECTIVE BOX
Patient is a 78y old  Male who presents with a chief complaint of Abnormal Labs (06 Jun 2018 16:28)    Briefly: 78 year-old M   Hx:        - CAD s/p CABG in 2010 and normal Nuc ST in 3/2018,        - Remote hx of diffuse large B cell lymphoma s/p chemo and RT in 2013       - HTN, Hypothyroidism, BPH, and Diabetes  Pw:  abnormal labs at presurgical testing      - Increasing SOB and weakness since February      - Negative cardiac workup, and negative pulmonary workup      - Saw oncology in May and found to have elevated D-dimer, sent to ED ([facility])           - CTA no PE but multiple liver lesions/metastatic disease.            - MRI: pancreatic tail mass, left adrenal gland mass, liver metastasis and a splenic vein thrombosis.            - Discharged from the hospital on Lovenox        - Has also had progressive weakness, loss of appetite, weight loss, fatigue, recent fainting spell, stomach pain, constipation       - On day of current admission           - Planned IR biopsy of the liver mets.            - Admitted for abnormal lab results prior to biopsy  Interval Event: Admitted to Medicine service, no acute events  Subjective: Complaint of SOB, dizziness, inability to ambulate, constipation, nausea, abdominal pain, neck pain. No CP. No f/c. Feels constipation medications are inadequate.    PHYSICAL EXAM:  Vitals: T(F): 98.2  HR: 90  BP: 124/76  RR: 19  SpO2: 98% on RA  GENERAL: NAD, cachetic  HEAD:  Atraumatic, Normocephalic  EYES: EOMI, PERRLA, conjunctiva and sclera clear  CHEST/LUNG: Clear to auscultation bilaterally; No wheeze  HEART: Regular rate and rhythm; No murmurs, rubs, or gallops  ABDOMEN: Soft, Nondistended; Bowel sounds present. Mild tenderness, more pronounced on the left  EXTREMITIES: WWP no edema  PSYCH: Alert, oriented, appropriate.  NEUROLOGY: non-focal  SKIN: No rashes or lesions    LABS:  CAPILLARY BLOOD GLUCOSE      POCT Blood Glucose.: 120 mg/dL (06 Jun 2018 22:24)  POCT Blood Glucose.: 218 mg/dL (06 Jun 2018 18:00)  POCT Blood Glucose.: 285 mg/dL (06 Jun 2018 13:55)  POCT Blood Glucose.: 310 mg/dL (06 Jun 2018 09:24)    I&O's Summary                            12.2   13.61 )-----------( 414      ( 06 Jun 2018 10:15 )             35.6     WBC Trend: 13.61<--  06-06    128<L>  |  87<L>  |  29<H>  ----------------------------<  330<H>  5.2   |  26  |  1.00    Ca    9.8      06 Jun 2018 10:15    TPro  7.9  /  Alb  3.8  /  TBili  0.6  /  DBili  x   /  AST  27  /  ALT  36  /  AlkPhos  173<H>  06-06    Creatinine Trend: 1.00<--, 1.07<--  PT/INR - ( 07 Jun 2018 06:30 )   PT: 13.2 SEC;   INR: 1.19          PTT - ( 07 Jun 2018 06:30 )  PTT:25.5 SEC              MEDICATIONS  (STANDING):  atorvastatin 40 milliGRAM(s) Oral at bedtime  dextrose 5%. 1000 milliLiter(s) (50 mL/Hr) IV Continuous <Continuous>  dextrose 50% Injectable 12.5 Gram(s) IV Push once  dextrose 50% Injectable 25 Gram(s) IV Push once  dextrose 50% Injectable 25 Gram(s) IV Push once  docusate sodium 100 milliGRAM(s) Oral three times a day  enoxaparin Injectable 70 milliGRAM(s) SubCutaneous every 12 hours  insulin glargine Injectable (LANTUS) 45 Unit(s) SubCutaneous at bedtime  insulin lispro (HumaLOG) corrective regimen sliding scale   SubCutaneous Before meals and at bedtime  insulin lispro Injectable (HumaLOG) 4 Unit(s) SubCutaneous three times a day before meals  levothyroxine 50 MICROGram(s) Oral daily  lisinopril 20 milliGRAM(s) Oral daily  metoprolol succinate  milliGRAM(s) Oral daily  morphine ER Tablet 15 milliGRAM(s) Oral two times a day  senna 2 Tablet(s) Oral at bedtime  tamsulosin 0.4 milliGRAM(s) Oral at bedtime    MEDICATIONS  (PRN):  dextrose 40% Gel 15 Gram(s) Oral once PRN Blood Glucose LESS THAN 70 milliGRAM(s)/deciliter  glucagon  Injectable 1 milliGRAM(s) IntraMuscular once PRN Glucose LESS THAN 70 milligrams/deciliter  morphine  - Injectable 4 milliGRAM(s) IV Push every 4 hours PRN Severe Pain (7 - 10)  oxyCODONE    IR 5 milliGRAM(s) Oral every 4 hours PRN Moderate Pain (4 - 6)

## 2018-06-07 NOTE — PROGRESS NOTE ADULT - ATTENDING COMMENTS
This morning patient with severe dyspnea, also orthostatic hypotension. Also complaining of L flank pain and abdominal pain. Sat noted to be 99% on RA, with lungs clear on auscultation. Etiology unclear. Later went to re-evaluate patient in afternoon - patient feeling better. Pain, dyspnea subsided.    BP improved with IV fluids. ACEI held. Metoprolol changed to 100 mg BID with holding parameters.    Will monitor hgb given slight drop this morning, and GI involvement of malignancy. Will consider repeat evaluation for PE if dyspnea persists or worsens given active metastatic malignancy, however patient did have CTPA at OSH 1 week ago.    Hyponatremia and hyperkalemia slightly improved. Low FENa, high urine osmolality suggests hypovolemic hyponatremia - continue low rate IV fluids for now. Also possible concomitant SIADH given malignancy - will need to monitor serum Na closely.    CEA and  both markedly elevated. Given distribution of tumor, likely GI malignancy, possibly pancreatic cancer. Plan for liver biopsy by IR tomorrow. This morning patient with severe dyspnea, also orthostatic hypotension. Also complaining of L flank pain and abdominal pain. Sat noted to be 99% on RA, with lungs clear on auscultation. Etiology unclear. Later went to re-evaluate patient in afternoon - patient feeling better. Pain, dyspnea subsided.    BP improved with IV fluids. ACEI held. Metoprolol changed to 100 mg BID with holding parameters.    Will monitor hgb given slight drop this morning, and GI involvement of malignancy. Will consider repeat evaluation for PE if dyspnea persists or worsens given active metastatic malignancy, however patient did have CTPA at OSH 1 week ago.    CXR clear, f/u results of EKG.    Hyponatremia and hyperkalemia slightly improved. Low FENa, high urine osmolality suggests hypovolemic hyponatremia - continue low rate IV fluids for now. Also possible concomitant SIADH given malignancy - will need to monitor serum Na closely.    CEA and  both markedly elevated. Given distribution of tumor, likely GI malignancy, possibly pancreatic cancer. Plan for liver biopsy by IR tomorrow.

## 2018-06-07 NOTE — PROGRESS NOTE ADULT - PROBLEM SELECTOR PLAN 2
- Likely multifactorial in the setting of metastatic disease with pleural effusions, deconditioning, and anemia.   - Pt also may not be taking deep breath in the setting of abd mets.   - Order incentive spirometer.   - Supplemental O2 if needed.   - PT - Likely multifactorial in the setting of metastatic disease with pleural effusions, deconditioning, and anemia.   - Pt also may not be taking deep breath in the setting of abd mets.   - Order incentive spirometer.   - Supplemental O2 if needed.   - EKG, CXR  - PT

## 2018-06-07 NOTE — PROGRESS NOTE ADULT - PROBLEM SELECTOR PLAN 6
- restart pts home BP meds: ACEi and metoprolol.  - If patient is persistently hyperkalemic will d/c ACEi  - Low salt diet. - Will hold anti-hypertensives in setting of orthostatic hypotension     - Bolus 500 ml LR  - Low salt diet.

## 2018-06-08 ENCOUNTER — RESULT REVIEW (OUTPATIENT)
Age: 78
End: 2018-06-08

## 2018-06-08 ENCOUNTER — TRANSCRIPTION ENCOUNTER (OUTPATIENT)
Age: 78
End: 2018-06-08

## 2018-06-08 LAB
APTT BLD: 25.6 SEC — LOW (ref 27.5–37.4)
BUN SERPL-MCNC: 17 MG/DL — SIGNIFICANT CHANGE UP (ref 7–23)
CALCIUM SERPL-MCNC: 9 MG/DL — SIGNIFICANT CHANGE UP (ref 8.4–10.5)
CHLORIDE SERPL-SCNC: 93 MMOL/L — LOW (ref 98–107)
CO2 SERPL-SCNC: 25 MMOL/L — SIGNIFICANT CHANGE UP (ref 22–31)
CREAT SERPL-MCNC: 0.91 MG/DL — SIGNIFICANT CHANGE UP (ref 0.5–1.3)
GLUCOSE SERPL-MCNC: 94 MG/DL — SIGNIFICANT CHANGE UP (ref 70–99)
HCT VFR BLD CALC: 31.4 % — LOW (ref 39–50)
HGB BLD-MCNC: 10.5 G/DL — LOW (ref 13–17)
INR BLD: 1.2 — HIGH (ref 0.88–1.17)
MAGNESIUM SERPL-MCNC: 2.3 MG/DL — SIGNIFICANT CHANGE UP (ref 1.6–2.6)
MCHC RBC-ENTMCNC: 27.4 PG — SIGNIFICANT CHANGE UP (ref 27–34)
MCHC RBC-ENTMCNC: 33.4 % — SIGNIFICANT CHANGE UP (ref 32–36)
MCV RBC AUTO: 82 FL — SIGNIFICANT CHANGE UP (ref 80–100)
NRBC # FLD: 0 — SIGNIFICANT CHANGE UP
PHOSPHATE SERPL-MCNC: 3.8 MG/DL — SIGNIFICANT CHANGE UP (ref 2.5–4.5)
PLATELET # BLD AUTO: 360 K/UL — SIGNIFICANT CHANGE UP (ref 150–400)
PMV BLD: 9.6 FL — SIGNIFICANT CHANGE UP (ref 7–13)
POTASSIUM SERPL-MCNC: 4.7 MMOL/L — SIGNIFICANT CHANGE UP (ref 3.5–5.3)
POTASSIUM SERPL-SCNC: 4.7 MMOL/L — SIGNIFICANT CHANGE UP (ref 3.5–5.3)
PROTHROM AB SERPL-ACNC: 13.8 SEC — HIGH (ref 9.8–13.1)
RBC # BLD: 3.83 M/UL — LOW (ref 4.2–5.8)
RBC # FLD: 13.4 % — SIGNIFICANT CHANGE UP (ref 10.3–14.5)
SODIUM SERPL-SCNC: 131 MMOL/L — LOW (ref 135–145)
WBC # BLD: 11.99 K/UL — HIGH (ref 3.8–10.5)
WBC # FLD AUTO: 11.99 K/UL — HIGH (ref 3.8–10.5)

## 2018-06-08 PROCEDURE — 99233 SBSQ HOSP IP/OBS HIGH 50: CPT | Mod: GC

## 2018-06-08 PROCEDURE — 77012 CT SCAN FOR NEEDLE BIOPSY: CPT | Mod: 26

## 2018-06-08 PROCEDURE — 88342 IMHCHEM/IMCYTCHM 1ST ANTB: CPT | Mod: 26

## 2018-06-08 PROCEDURE — 88307 TISSUE EXAM BY PATHOLOGIST: CPT | Mod: 26

## 2018-06-08 PROCEDURE — 88341 IMHCHEM/IMCYTCHM EA ADD ANTB: CPT | Mod: 26

## 2018-06-08 PROCEDURE — 47000 NEEDLE BIOPSY OF LIVER PERQ: CPT

## 2018-06-08 RX ORDER — LEVOTHYROXINE SODIUM 125 MCG
75 TABLET ORAL DAILY
Qty: 0 | Refills: 0 | Status: DISCONTINUED | OUTPATIENT
Start: 2018-06-08 | End: 2018-06-13

## 2018-06-08 RX ORDER — SODIUM CHLORIDE 9 MG/ML
500 INJECTION INTRAMUSCULAR; INTRAVENOUS; SUBCUTANEOUS ONCE
Qty: 0 | Refills: 0 | Status: COMPLETED | OUTPATIENT
Start: 2018-06-08 | End: 2018-06-08

## 2018-06-08 RX ORDER — ENOXAPARIN SODIUM 100 MG/ML
70 INJECTION SUBCUTANEOUS EVERY 12 HOURS
Qty: 0 | Refills: 0 | Status: DISCONTINUED | OUTPATIENT
Start: 2018-06-08 | End: 2018-06-09

## 2018-06-08 RX ORDER — SODIUM CHLORIDE 9 MG/ML
1000 INJECTION INTRAMUSCULAR; INTRAVENOUS; SUBCUTANEOUS
Qty: 0 | Refills: 0 | Status: DISCONTINUED | OUTPATIENT
Start: 2018-06-08 | End: 2018-06-09

## 2018-06-08 RX ADMIN — ENOXAPARIN SODIUM 70 MILLIGRAM(S): 100 INJECTION SUBCUTANEOUS at 18:22

## 2018-06-08 RX ADMIN — Medication 100 MILLIGRAM(S): at 06:07

## 2018-06-08 RX ADMIN — INSULIN GLARGINE 45 UNIT(S): 100 INJECTION, SOLUTION SUBCUTANEOUS at 22:40

## 2018-06-08 RX ADMIN — POLYETHYLENE GLYCOL 3350 17 GRAM(S): 17 POWDER, FOR SOLUTION ORAL at 06:08

## 2018-06-08 RX ADMIN — Medication 100 MILLIGRAM(S): at 14:55

## 2018-06-08 RX ADMIN — POLYETHYLENE GLYCOL 3350 17 GRAM(S): 17 POWDER, FOR SOLUTION ORAL at 18:22

## 2018-06-08 RX ADMIN — SODIUM CHLORIDE 100 MILLILITER(S): 9 INJECTION INTRAMUSCULAR; INTRAVENOUS; SUBCUTANEOUS at 15:37

## 2018-06-08 RX ADMIN — Medication 50 MICROGRAM(S): at 06:07

## 2018-06-08 RX ADMIN — SODIUM CHLORIDE 75 MILLILITER(S): 9 INJECTION INTRAMUSCULAR; INTRAVENOUS; SUBCUTANEOUS at 06:08

## 2018-06-08 RX ADMIN — SENNA PLUS 2 TABLET(S): 8.6 TABLET ORAL at 22:41

## 2018-06-08 RX ADMIN — MORPHINE SULFATE 4 MILLIGRAM(S): 50 CAPSULE, EXTENDED RELEASE ORAL at 19:00

## 2018-06-08 RX ADMIN — ATORVASTATIN CALCIUM 40 MILLIGRAM(S): 80 TABLET, FILM COATED ORAL at 22:40

## 2018-06-08 RX ADMIN — TAMSULOSIN HYDROCHLORIDE 0.4 MILLIGRAM(S): 0.4 CAPSULE ORAL at 22:40

## 2018-06-08 RX ADMIN — MORPHINE SULFATE 4 MILLIGRAM(S): 50 CAPSULE, EXTENDED RELEASE ORAL at 03:07

## 2018-06-08 RX ADMIN — MORPHINE SULFATE 15 MILLIGRAM(S): 50 CAPSULE, EXTENDED RELEASE ORAL at 07:56

## 2018-06-08 RX ADMIN — SODIUM CHLORIDE 75 MILLILITER(S): 9 INJECTION INTRAMUSCULAR; INTRAVENOUS; SUBCUTANEOUS at 14:53

## 2018-06-08 RX ADMIN — Medication 10 MILLIGRAM(S): at 01:12

## 2018-06-08 RX ADMIN — MORPHINE SULFATE 4 MILLIGRAM(S): 50 CAPSULE, EXTENDED RELEASE ORAL at 18:32

## 2018-06-08 RX ADMIN — MORPHINE SULFATE 4 MILLIGRAM(S): 50 CAPSULE, EXTENDED RELEASE ORAL at 02:52

## 2018-06-08 RX ADMIN — Medication 4 UNIT(S): at 18:21

## 2018-06-08 RX ADMIN — Medication 100 MILLIGRAM(S): at 22:40

## 2018-06-08 RX ADMIN — Medication 100 MILLIGRAM(S): at 06:08

## 2018-06-08 RX ADMIN — SODIUM CHLORIDE 1000 MILLILITER(S): 9 INJECTION INTRAMUSCULAR; INTRAVENOUS; SUBCUTANEOUS at 15:00

## 2018-06-08 RX ADMIN — MORPHINE SULFATE 15 MILLIGRAM(S): 50 CAPSULE, EXTENDED RELEASE ORAL at 06:12

## 2018-06-08 NOTE — CHART NOTE - NSCHARTNOTEFT_GEN_A_CORE
PRE-INTERVENTIONAL RADIOLOGY PROCEDURE NOTE      Patient Age: 77 yo    Patient Gender: Male    Procedure: Liver Mass Biopsy    Diagnosis/Indication: Patient with metastatic disease, unknown primary.     Interventional Radiology Attending Physician: Aaron Lennon    Ordering Attending Physician: Alexis Moeller    Pertinent Medical History:        CAD s/p CABG in 2010 and normal Nuc ST in 3/2018,        Remote hx of diffuse large B cell lymphoma s/p chemo and RT in 2013       HTN, Hypothyroidism, BPH, and Diabetes    Recent history of fatigue, weight-loss, shortness of breath. CT evidence of metastatic disease.  CT Abdomen and Pelvis No Cont (06.06.18 @ 12:45): Metastatic disease involving the liver and the lung bases. Peritoneal carcinomatosis. Heterogeneous mass involving the gastric fundus, pancreatic body/tail, left adrenal gland, and the splenic hilum.      Pertinent labs:                      10.5   11.99 )-----------( 360      ( 08 Jun 2018 05:30 )             31.4       06-07    132<L>  |  94<L>  |  19  ----------------------------<  129<H>  4.7   |  27  |  0.87    Ca    9.0      07 Jun 2018 15:49  Phos  4.4     06-07  Mg     2.1     06-07    TPro  7.5  /  Alb  3.4  /  TBili  0.6  /  DBili  x   /  AST  28  /  ALT  30  /  AlkPhos  147<H>  06-07      PT/INR - ( 08 Jun 2018 05:30 )   PT: 13.8 SEC;   INR: 1.20          PTT - ( 08 Jun 2018 05:30 )  PTT:25.6 SEC    Patient and Family Aware ? Yes

## 2018-06-08 NOTE — PROGRESS NOTE ADULT - PROBLEM SELECTOR PLAN 1
- GOC had with pt.     - Pt interested in indicated cancer treatment        - pending biopsy results     - Full Code  - Plan for liver biopsy today - Advanced care directives discussed with patient  - Pt interested in indicated cancer treatment   - pending biopsy results  - Full Code  - Plan for liver biopsy today

## 2018-06-08 NOTE — DISCHARGE NOTE ADULT - PATIENT PORTAL LINK FT
You can access the [facility] Patient Portal, offered by [facility], by registering with the following website: [email or web address]

## 2018-06-08 NOTE — DISCHARGE NOTE ADULT - MEDICATION SUMMARY - MEDICATIONS TO TAKE
I will START or STAY ON the medications listed below when I get home from the hospital:    morphine 12 hour extended release  -- 15 milligram(s) by mouth 2 times a day  -- Indication: For Metastatic adenocarcinoma    oxyCODONE 5 mg oral tablet  -- 1 tab(s) by mouth every 4 hours, As Needed  -- Indication: For Metastatic adenocarcinoma    ramipril 5 mg oral tablet  -- 1 tab(s) by mouth once a day  -- Indication: For Essential hypertension    tamsulosin 0.4 mg oral capsule  -- 1 cap(s) by mouth once a day  -- Indication: For BPH    Eliquis 5 mg oral tablet  -- 1 tab(s) by mouth 2 times a day  -- Indication: For Thrombosis    Tresiba FlexTouch 100 units/mL subcutaneous solution  -- 50 unit(s) subcutaneous once a day (at bedtime)  -- Indication: For Diabetes    glimepiride 4 mg oral tablet  -- 1 tab(s) by mouth 2 times a day  -- Indication: For Diabetes    NovoLOG FlexPen 100 units/mL injectable solution  -- 5 unit(s) injectable 3 times a day (before meals)  -- Indication: For Diabetes    Crestor 40 mg oral tablet  -- 1 tab(s) by mouth once a day (at bedtime)  -- Indication: For Need for prophylactic measure    metoprolol succinate 200 mg oral tablet, extended release  -- 1 tab(s) by mouth once a day  -- Indication: For CAD (coronary artery disease)    Synthroid 50 mcg (0.05 mg) oral tablet  -- 1 tab(s) by mouth once a day  -- Indication: For Hypothyroidism I will START or STAY ON the medications listed below when I get home from the hospital:    oxyCODONE 5 mg oral tablet  -- 1 tab(s) by mouth every 4 hours, As Needed  -- Indication: For Metastatic adenocarcinoma    morphine 12 hour extended release  -- 12 milligram(s) by mouth 2 times a day  -- Indication: For Metastatic adenocarcinoma    ramipril 5 mg oral tablet  -- 1 tab(s) by mouth once a day  -- Indication: For Essential hypertension    tamsulosin 0.4 mg oral capsule  -- 1 cap(s) by mouth once a day  -- Indication: For BPH    Eliquis 5 mg oral tablet  -- 1 tab(s) by mouth 2 times a day  -- Indication: For Thrombosis    Tresiba FlexTouch 100 units/mL subcutaneous solution  -- 50 unit(s) subcutaneous once a day (at bedtime)  -- Indication: For Diabetes    glimepiride 4 mg oral tablet  -- 1 tab(s) by mouth 2 times a day  -- Indication: For Diabetes    NovoLOG FlexPen 100 units/mL injectable solution  -- 5 unit(s) injectable 3 times a day (before meals)  -- Indication: For Diabetes    Crestor 40 mg oral tablet  -- 1 tab(s) by mouth once a day (at bedtime)  -- Indication: For Need for prophylactic measure    metoprolol succinate 200 mg oral tablet, extended release  -- 1 tab(s) by mouth once a day  -- Indication: For CAD (coronary artery disease)    Synthroid 50 mcg (0.05 mg) oral tablet  -- 1 tab(s) by mouth once a day  -- Indication: For Hypothyroidism

## 2018-06-08 NOTE — PROGRESS NOTE ADULT - PROBLEM SELECTOR PLAN 6
- Will hold anti-hypertensives in setting of orthostatic hypotension     - s/p bolus 500 ml LR     - c/w  NS 75 ml/hr       - Monitor for overload       - Patient has crackles but mouth is dry, no edema.   - Low salt diet.

## 2018-06-08 NOTE — DISCHARGE NOTE ADULT - PLAN OF CARE
Please see Dr. Medina tomorrow You came to the hospital with abnormalities in your blood work, which have since resolved. Further work-up, including a liver biopsy and imaging, has unfortunately revealed metastatic cancer likely originating from the pancreas. We consulted our oncologists, who recommended discharge and for you to see Dr. Medina tomorrow, at which time he can discuss treatment options as well as help provide you with assistance at home in the form of a home health aide.

## 2018-06-08 NOTE — PROGRESS NOTE ADULT - SUBJECTIVE AND OBJECTIVE BOX
Patient is a 78y old  Male who presents with a chief complaint of Abnormal Labs (06 Jun 2018 16:28)      Briefly: 78 year-old M   Hx:        - CAD s/p CABG in 2010 and normal Nuc ST in 3/2018,        - Remote hx of diffuse large B cell lymphoma s/p chemo and RT in 2013       - HTN, Hypothyroidism, BPH, and Diabetes  Pw:  abnormal labs at presurgical testing      - Increasing SOB and weakness since February      - Negative cardiac workup, and negative pulmonary workup      - Saw oncology in May and found to have elevated D-dimer, sent to ED ([facility])           - CTA no PE but multiple liver lesions/metastatic disease.            - MRI: pancreatic tail mass, left adrenal gland mass, liver metastasis and a splenic vein thrombosis.            - Discharged from the hospital on Lovenox        - Has also had progressive weakness, loss of appetite, weight loss, fatigue, recent fainting spell, stomach pain, constipation       - On day of current admission           - Planned IR biopsy of the liver mets.            - Admitted for abnormal lab results prior to biopsy  - Currently, pending IR guided biopsy  Interval Event: No acute events  Subjective: Feels very thirsty. Must force himself to eat, "food has no taste." Has been nauseous and had one episode of emesis. Still dizzy when sitting up. 1x episode of diarrhea. No dysuria. No f/c. Abdominal pain persistent.    PHYSICAL EXAM:  Vitals: T(F): 98  HR: 76  BP: 118/72  RR: 18  SpO2: 100% on RA  GENERAL: NAD, cachetic  HEAD:  Atraumatic, Normocephalic, Mouth is dry  EYES: EOMI, PERRLA, conjunctiva and sclera clear  CHEST/LUNG: There are crackles most prominent in the RLL.  HEART: Regular rate and rhythm; No murmurs, rubs, or gallops  ABDOMEN: Soft, Bowel sounds present. Distended, Mild tenderness, more pronounced on the left  EXTREMITIES: WWP no edema  PSYCH: Alert, oriented, appropriate.  NEUROLOGY: non-focal  SKIN: No rashes or lesions    LABS:  CAPILLARY BLOOD GLUCOSE      POCT Blood Glucose.: 151 mg/dL (07 Jun 2018 21:50)  POCT Blood Glucose.: 124 mg/dL (07 Jun 2018 17:29)  POCT Blood Glucose.: 176 mg/dL (07 Jun 2018 12:57)  POCT Blood Glucose.: 210 mg/dL (07 Jun 2018 08:59)    I&O's Summary    07 Jun 2018 07:01  -  08 Jun 2018 06:48  --------------------------------------------------------  IN: 1100 mL / OUT: 0 mL / NET: 1100 mL                              10.5   11.99 )-----------( 360      ( 08 Jun 2018 05:30 )             31.4     WBC Trend: 11.99<--, 13.05<--, 13.61<--  06-07    132<L>  |  94<L>  |  19  ----------------------------<  129<H>  4.7   |  27  |  0.87    Ca    9.0      07 Jun 2018 15:49  Phos  4.4     06-07  Mg     2.1     06-07    TPro  7.5  /  Alb  3.4  /  TBili  0.6  /  DBili  x   /  AST  28  /  ALT  30  /  AlkPhos  147<H>  06-07    Creatinine Trend: 0.87<--, 0.93<--, 1.00<--, 1.07<--  PT/INR - ( 08 Jun 2018 05:30 )   PT: 13.8 SEC;   INR: 1.20          PTT - ( 08 Jun 2018 05:30 )  PTT:25.6 SEC              MEDICATIONS  (STANDING):  atorvastatin 40 milliGRAM(s) Oral at bedtime  dextrose 5%. 1000 milliLiter(s) (50 mL/Hr) IV Continuous <Continuous>  dextrose 50% Injectable 12.5 Gram(s) IV Push once  dextrose 50% Injectable 25 Gram(s) IV Push once  dextrose 50% Injectable 25 Gram(s) IV Push once  docusate sodium 100 milliGRAM(s) Oral three times a day  insulin glargine Injectable (LANTUS) 45 Unit(s) SubCutaneous at bedtime  insulin lispro (HumaLOG) corrective regimen sliding scale   SubCutaneous Before meals and at bedtime  insulin lispro Injectable (HumaLOG) 4 Unit(s) SubCutaneous three times a day before meals  levothyroxine 50 MICROGram(s) Oral daily  metoprolol tartrate 100 milliGRAM(s) Oral two times a day  morphine ER Tablet 15 milliGRAM(s) Oral two times a day  polyethylene glycol 3350 17 Gram(s) Oral two times a day  senna 2 Tablet(s) Oral at bedtime  sodium chloride 0.9%. 1000 milliLiter(s) (75 mL/Hr) IV Continuous <Continuous>  tamsulosin 0.4 milliGRAM(s) Oral at bedtime    MEDICATIONS  (PRN):  dextrose 40% Gel 15 Gram(s) Oral once PRN Blood Glucose LESS THAN 70 milliGRAM(s)/deciliter  glucagon  Injectable 1 milliGRAM(s) IntraMuscular once PRN Glucose LESS THAN 70 milligrams/deciliter  morphine  - Injectable 4 milliGRAM(s) IV Push every 4 hours PRN Severe Pain (7 - 10)  oxyCODONE    IR 5 milliGRAM(s) Oral every 4 hours PRN Moderate Pain (4 - 6) Patient is a 78y old  Male who presents with a chief complaint of Abnormal Labs (06 Jun 2018 16:28)      Briefly: 78 year-old M   Hx:        - CAD s/p CABG in 2010 and normal Nuc ST in 3/2018,        - Remote hx of diffuse large B cell lymphoma s/p chemo and RT in 2013       - HTN, Hypothyroidism, BPH, and Diabetes  Pw:  abnormal labs at presurgical testing      - Increasing SOB and weakness since February      - Negative cardiac workup, and negative pulmonary workup      - Saw oncology in May and found to have elevated D-dimer, sent to ED ([facility])           - CTA no PE but multiple liver lesions/metastatic disease.            - MRI: pancreatic tail mass, left adrenal gland mass, liver metastasis and a splenic vein thrombosis.            - Discharged from the hospital on Lovenox        - Has also had progressive weakness, loss of appetite, weight loss, fatigue, recent fainting spell, stomach pain, constipation       - On day of current admission           - Planned IR biopsy of the liver mets.            - Admitted for abnormal lab results prior to biopsy  - Currently, pending IR guided biopsy  Interval Event: No acute events  Subjective: Feels very thirsty. Must force himself to eat, "food has no taste." Has been nauseous and had one episode of emesis. Still dizzy when sitting up. 1x episode of diarrhea. No dysuria. No f/c. Abdominal pain persistent.    PHYSICAL EXAM:  Vitals: T(F): 98  HR: 76  BP: 118/72  RR: 18  SpO2: 100% on RA  GENERAL: NAD, cachetic  HEAD:  Atraumatic, Normocephalic, Mouth is dry  EYES: EOMI, PERRLA, conjunctiva and sclera clear  CHEST/LUNG: There are crackles most prominent in the RLL.  HEART: Regular rate and rhythm; No murmurs, rubs, or gallops  ABDOMEN: Soft, Bowel sounds present. Distended, Mild tenderness, more pronounced on the left  EXTREMITIES: WWP no edema  PSYCH: Alert, oriented, appropriate.  NEUROLOGY: non-focal  SKIN: No rashes or lesions    LABS:  CAPILLARY BLOOD GLUCOSE      POCT Blood Glucose.: 151 mg/dL (07 Jun 2018 21:50)  POCT Blood Glucose.: 124 mg/dL (07 Jun 2018 17:29)  POCT Blood Glucose.: 176 mg/dL (07 Jun 2018 12:57)  POCT Blood Glucose.: 210 mg/dL (07 Jun 2018 08:59)    I&O's Summary    07 Jun 2018 07:01  -  08 Jun 2018 06:48  --------------------------------------------------------  IN: 1100 mL / OUT: 0 mL / NET: 1100 mL                              10.5   11.99 )-----------( 360      ( 08 Jun 2018 05:30 )             31.4     WBC Trend: 11.99<--, 13.05<--, 13.61<--  06-07    132<L>  |  94<L>  |  19  ----------------------------<  129<H>  4.7   |  27  |  0.87    Ca    9.0      07 Jun 2018 15:49  Phos  4.4     06-07  Mg     2.1     06-07    TPro  7.5  /  Alb  3.4  /  TBili  0.6  /  DBili  x   /  AST  28  /  ALT  30  /  AlkPhos  147<H>  06-07    Creatinine Trend: 0.87<--, 0.93<--, 1.00<--, 1.07<--  PT/INR - ( 08 Jun 2018 05:30 )   PT: 13.8 SEC;   INR: 1.20          PTT - ( 08 Jun 2018 05:30 )  PTT:25.6 SEC    MEDICATIONS  (STANDING):  atorvastatin 40 milliGRAM(s) Oral at bedtime  dextrose 5%. 1000 milliLiter(s) (50 mL/Hr) IV Continuous <Continuous>  dextrose 50% Injectable 12.5 Gram(s) IV Push once  dextrose 50% Injectable 25 Gram(s) IV Push once  dextrose 50% Injectable 25 Gram(s) IV Push once  docusate sodium 100 milliGRAM(s) Oral three times a day  insulin glargine Injectable (LANTUS) 45 Unit(s) SubCutaneous at bedtime  insulin lispro (HumaLOG) corrective regimen sliding scale   SubCutaneous Before meals and at bedtime  insulin lispro Injectable (HumaLOG) 4 Unit(s) SubCutaneous three times a day before meals  levothyroxine 50 MICROGram(s) Oral daily  metoprolol tartrate 100 milliGRAM(s) Oral two times a day  morphine ER Tablet 15 milliGRAM(s) Oral two times a day  polyethylene glycol 3350 17 Gram(s) Oral two times a day  senna 2 Tablet(s) Oral at bedtime  sodium chloride 0.9%. 1000 milliLiter(s) (75 mL/Hr) IV Continuous <Continuous>  tamsulosin 0.4 milliGRAM(s) Oral at bedtime    MEDICATIONS  (PRN):  dextrose 40% Gel 15 Gram(s) Oral once PRN Blood Glucose LESS THAN 70 milliGRAM(s)/deciliter  glucagon  Injectable 1 milliGRAM(s) IntraMuscular once PRN Glucose LESS THAN 70 milligrams/deciliter  morphine  - Injectable 4 milliGRAM(s) IV Push every 4 hours PRN Severe Pain (7 - 10)  oxyCODONE    IR 5 milliGRAM(s) Oral every 4 hours PRN Moderate Pain (4 - 6) Patient is a 78y old  Male who presents with a chief complaint of Abnormal Labs (06 Jun 2018 16:28)      Briefly: 78 year-old M   Hx:        - CAD s/p CABG in 2010 and normal Nuc ST in 3/2018,        - Remote hx of diffuse large B cell lymphoma s/p chemo and RT in 2013       - HTN, Hypothyroidism, BPH, and Diabetes  Pw:  abnormal labs at presurgical testing      - Increasing SOB and weakness since February      - Negative cardiac workup, and negative pulmonary workup      - Saw oncology in May and found to have elevated D-dimer, sent to ED ([facility])           - CTA no PE but multiple liver lesions/metastatic disease.            - MRI: pancreatic tail mass, left adrenal gland mass, liver metastasis and a splenic vein thrombosis.            - Discharged from the hospital on Lovenox        - Has also had progressive weakness, loss of appetite, weight loss, fatigue, recent fainting spell, stomach pain, constipation       - On day of current admission           - Planned IR biopsy of the liver mets.            - Admitted for abnormal lab results prior to biopsy  - Currently, pending IR guided biopsy  Interval Event: No acute events  Subjective: Feels very thirsty. Must force himself to eat, "food has no taste." Has been nauseous and had one episode of emesis. Still dizzy when sitting up. 1x episode of diarrhea. No dysuria. No f/c. Abdominal pain persistent.    PHYSICAL EXAM:  Vitals: T(F): 98  HR: 76  BP: 118/72  RR: 18  SpO2: 100% on RA  GENERAL: NAD, cachetic  HEAD:  Atraumatic, Normocephalic, Mouth is dry  EYES: EOMI, PERRLA, conjunctiva and sclera clear  CHEST/LUNG: There are crackles most prominent in the RLL.  HEART: Regular rate and rhythm; No murmurs, rubs, or gallops  ABDOMEN: Soft, Bowel sounds present. Distended, Mild tenderness, more pronounced on the left  EXTREMITIES: WWP no edema  PSYCH: Alert, oriented, appropriate.  NEUROLOGY: non-focal  SKIN: No rashes or lesions    LABS:  CAPILLARY BLOOD GLUCOSE      POCT Blood Glucose.: 151 mg/dL (07 Jun 2018 21:50)  POCT Blood Glucose.: 124 mg/dL (07 Jun 2018 17:29)  POCT Blood Glucose.: 176 mg/dL (07 Jun 2018 12:57)  POCT Blood Glucose.: 210 mg/dL (07 Jun 2018 08:59)    I&O's Summary    07 Jun 2018 07:01  -  08 Jun 2018 06:48  --------------------------------------------------------  IN: 1100 mL / OUT: 0 mL / NET: 1100 mL                              10.5   11.99 )-----------( 360      ( 08 Jun 2018 05:30 )             31.4     WBC Trend: 11.99<--, 13.05<--, 13.61<--  06-07    132<L>  |  94<L>  |  19  ----------------------------<  129<H>  4.7   |  27  |  0.87    Ca    9.0      07 Jun 2018 15:49  Phos  4.4     06-07  Mg     2.1     06-07    TPro  7.5  /  Alb  3.4  /  TBili  0.6  /  DBili  x   /  AST  28  /  ALT  30  /  AlkPhos  147<H>  06-07    Creatinine Trend: 0.87<--, 0.93<--, 1.00<--, 1.07<--  PT/INR - ( 08 Jun 2018 05:30 )   PT: 13.8 SEC;   INR: 1.20          PTT - ( 08 Jun 2018 05:30 )  PTT:25.6 SEC    Hemoglobin A1C, Whole Blood: 12.6: High Risk (prediabetic)    5.7 - 6.4 %  Diabetic, diagnostic           > 6.5 %  ADA diabetic treatment goal    < 7.0 %    HbA1C values may not accurately reflect mean blood glucose  in patients with Hb variants.  Suggest clinical correlation. % (06.07.18 @ 06:30)        MEDICATIONS  (STANDING):  atorvastatin 40 milliGRAM(s) Oral at bedtime  dextrose 5%. 1000 milliLiter(s) (50 mL/Hr) IV Continuous <Continuous>  dextrose 50% Injectable 12.5 Gram(s) IV Push once  dextrose 50% Injectable 25 Gram(s) IV Push once  dextrose 50% Injectable 25 Gram(s) IV Push once  docusate sodium 100 milliGRAM(s) Oral three times a day  insulin glargine Injectable (LANTUS) 45 Unit(s) SubCutaneous at bedtime  insulin lispro (HumaLOG) corrective regimen sliding scale   SubCutaneous Before meals and at bedtime  insulin lispro Injectable (HumaLOG) 4 Unit(s) SubCutaneous three times a day before meals  levothyroxine 50 MICROGram(s) Oral daily  metoprolol tartrate 100 milliGRAM(s) Oral two times a day  morphine ER Tablet 15 milliGRAM(s) Oral two times a day  polyethylene glycol 3350 17 Gram(s) Oral two times a day  senna 2 Tablet(s) Oral at bedtime  sodium chloride 0.9%. 1000 milliLiter(s) (75 mL/Hr) IV Continuous <Continuous>  tamsulosin 0.4 milliGRAM(s) Oral at bedtime    MEDICATIONS  (PRN):  dextrose 40% Gel 15 Gram(s) Oral once PRN Blood Glucose LESS THAN 70 milliGRAM(s)/deciliter  glucagon  Injectable 1 milliGRAM(s) IntraMuscular once PRN Glucose LESS THAN 70 milligrams/deciliter  morphine  - Injectable 4 milliGRAM(s) IV Push every 4 hours PRN Severe Pain (7 - 10)  oxyCODONE    IR 5 milliGRAM(s) Oral every 4 hours PRN Moderate Pain (4 - 6)

## 2018-06-08 NOTE — DISCHARGE NOTE ADULT - OTHER SIGNIFICANT FINDINGS
< from: CT Chest No Cont (06.11.18 @ 19:46) >  IMPRESSION: Numerous subcentimeter pulmonary nodules as described above   worrisome for metastatic disease.    Small right and trace left pleural effusions with bibasilar areas of   atelectasis, right greater than left demonstrating interval progression   since June 6, 2018.    For complete evaluation of the upper abdominal lesions, please refer to   dedicated abdominal CT of June 6, 2018.    < end of copied text >      < from: CT Abdomen and Pelvis No Cont (06.06.18 @ 12:45) >  IMPRESSION:  Metastatic disease involving the liver and the lung bases. Peritoneal   carcinomatosis. Heterogeneous mass involving the gastric fundus,   pancreatic body/tail, left adrenal gland, and the splenic hilum.    < end of copied text >    < from: CT Head No Cont (06.06.18 @ 12:52) >  IMPRESSION:  Microvasculardisease.  No acute intracranial hemorrhage.    < end of copied text >

## 2018-06-08 NOTE — DISCHARGE NOTE ADULT - CONDITIONS AT DISCHARGE
He is improved , alert , oriented x 4 and is independent with his care, vital signs are stable, and Glucose is stable in the 130's.  Appetite is ok vital signs are stable and general weakness is unchanged.  He will have physical therapy at Home.  Instructions are discussed and provided.

## 2018-06-08 NOTE — PROGRESS NOTE ADULT - PROBLEM SELECTOR PLAN 7
- Check A1C in AM  - Pt started on 45 Lantus and 4 premeal.   - Monitor FS premeal and at bedtime and cover with ISS.  - Carb consistent diet. 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   - Monitor FS premeal and at bedtime and cover with ISS.  - Carb consistent diet.

## 2018-06-08 NOTE — DISCHARGE NOTE ADULT - HOSPITAL COURSE
Mr. Leonard is a 78 year-old man with a history of CAD s/p CABG in 2010 and normal Nuc ST in 3/2018, Remote hx of diffuse large B cell lymphoma s/p chemo and RT in 2013, HTN, Hypothyroidism, BPH, and Diabetes who presented with abnormal labs at presurgical testing. He has had increasing SOB and weakness since February with a negative cardiac workup, and negative pulmonary workup. He was seen by oncology in May and found to have elevated D-dimer, sent to ED ([facility]). CTA showed no PE but multiple liver lesions/metastatic disease. An MRI showed pancreatic tail mass, left adrenal gland mass, liver metastasis and a splenic vein thrombosis. He was discharged from the hospital on Lovenox. Has also had progressive weakness, loss of appetite, weight loss, fatigue, recent fainting spell, stomach pain, constipation. On his the day of his current admission he was planned IR biopsy of the liver mets. However, he was instead admitted for abnormal lab results prior to biopsy. Biopsy was performed on day 2 of admission, results pending. Discharge pending re-evaluation by PT. Mr. Leonard is a 78 year-old man with a history of CAD s/p CABG in 2010 and normal Nuc ST in 3/2018, Remote hx of diffuse large B cell lymphoma s/p chemo and RT in 2013, HTN, Hypothyroidism, BPH, and Diabetes who presented with abnormal labs at presurgical testing. He has had increasing SOB and weakness since February with a negative cardiac workup, and negative pulmonary workup. He was seen by oncology in May and found to have elevated D-dimer, sent to ED ([facility]). CTA showed no PE but multiple liver lesions/metastatic disease. An MRI showed pancreatic tail mass, left adrenal gland mass, liver metastasis and a splenic vein thrombosis. He was discharged from the hospital on Lovenox. Has also had progressive weakness, loss of appetite, weight loss, fatigue, recent fainting spell, stomach pain, constipation. On his the day of his current admission he was planned IR biopsy of the liver mets. However, he was instead admitted for abnormal lab results prior to biopsy. Biopsy was performed on day 2 of admission, results indicating poorly differentiated adenocarcinoma likely pancreatic vs. GI in etiology. CA 19-19 was elevated to 5638. LDH and uric acid were generally WNL. CT abd/pel, CT chest shows evidence of widely metastatic disease. CTH was generally unremarkable. Patient seen by oncology, who recommended outpatient f/u with Dr. Medina. Patient discharged home on 6/13 in stable condition.

## 2018-06-08 NOTE — PROGRESS NOTE ADULT - PROBLEM SELECTOR PLAN 2
- Hyponatremia and hyperkalemia.   - Unclear etiology at this time.       - Possible etiologies         -  adrenal mass (however less likely given it is not bilateral.)          - poor PO intake and dehydration  - f/u urine Na, K, Cl, Creatinine, and osm.   - restart diet, and hold off on IVF for now.   - Daily BMP.   - AM Cortisol elevated at 25.7 - Hyponatremia and hyperkalemia.   - Unclear etiology at this time.       - Possible etiologies         -  adrenal mass (however less likely given it is not bilateral.)          - poor PO intake and dehydration  - f/u urine Na, K, Cl, Creatinine, and osm.   - restart diet, and hold off on IVF for now.   - Daily BMP.   - AM Cortisol elevated at 25.7 ruling out adrenal insufficiency - Hyponatremia and hyperkalemia.   - Unclear etiology at this time.       - Possible etiologies         -  adrenal mass (however less likely given it is not bilateral.)  AM Cortisol elevated at 25.7 ruling out adrenal insufficiency         - poor PO intake and dehydration  - f/u urine Na, K, Cl, Creatinine, and osm.   - restart diet  - Continue IVF  - Daily BMP.

## 2018-06-08 NOTE — DISCHARGE NOTE ADULT - HOME CARE AGENCY
[facility] AT [place] [phone].  Nurse to visit on day after discharge. Nurse to call prior to visit to arrange. Physical Therapy to follow.

## 2018-06-08 NOTE — DISCHARGE NOTE ADULT - CARE PROVIDER_API CALL
Maykel Medina (DO), Internal Medicine; Oncology  [address] [place] [address]  Phone: [phone]  Fax: [phone]

## 2018-06-08 NOTE — PROGRESS NOTE ADULT - PROBLEM SELECTOR PLAN 3
- Likely multifactorial in the setting of metastatic disease with pleural effusions, deconditioning, and anemia.   - Pt also may not be taking deep breath in the setting of abd mets.   - Order incentive spirometer.   - Supplemental O2 if needed.   - EKG: NSR  - CXR: Clear lungs (6/7)  - PT

## 2018-06-08 NOTE — PROGRESS NOTE ADULT - PROBLEM SELECTOR PLAN 5
- Splenic vein thrombosis.   - Pt was on Eliquis at home, but was hold it the past two days in preparation for biopsy.   - At this time will start treatment dose of lovenox with a plan to start holding once pt is cleared for biopsy.   - Monitor for signs of bleeding. - Splenic vein thrombosis.   - Pt was on Eliquis at home, but held it the past two days in preparation for biopsy.   - Hold lovenox this am with plan to resume post procedure  - Monitor for signs of bleeding.

## 2018-06-08 NOTE — DISCHARGE NOTE ADULT - CARE PLAN
Principal Discharge DX:	Electrolyte abnormality Principal Discharge DX:	Electrolyte abnormality  Goal:	Please see Dr. Medina tomorrow  Assessment and plan of treatment:	You came to the hospital with abnormalities in your blood work, which have since resolved. Further work-up, including a liver biopsy and imaging, has unfortunately revealed metastatic cancer likely originating from the pancreas. We consulted our oncologists, who recommended discharge and for you to see Dr. Medina tomorrow, at which time he can discuss treatment options as well as help provide you with assistance at home in the form of a home health aide.

## 2018-06-09 LAB
BUN SERPL-MCNC: 13 MG/DL — SIGNIFICANT CHANGE UP (ref 7–23)
CALCIUM SERPL-MCNC: 8.6 MG/DL — SIGNIFICANT CHANGE UP (ref 8.4–10.5)
CHLORIDE SERPL-SCNC: 95 MMOL/L — LOW (ref 98–107)
CO2 SERPL-SCNC: 24 MMOL/L — SIGNIFICANT CHANGE UP (ref 22–31)
CREAT SERPL-MCNC: 0.83 MG/DL — SIGNIFICANT CHANGE UP (ref 0.5–1.3)
GLUCOSE SERPL-MCNC: 46 MG/DL — LOW (ref 70–99)
HCT VFR BLD CALC: 30.7 % — LOW (ref 39–50)
HGB BLD-MCNC: 9.9 G/DL — LOW (ref 13–17)
MAGNESIUM SERPL-MCNC: 2.1 MG/DL — SIGNIFICANT CHANGE UP (ref 1.6–2.6)
MCHC RBC-ENTMCNC: 27.3 PG — SIGNIFICANT CHANGE UP (ref 27–34)
MCHC RBC-ENTMCNC: 32.2 % — SIGNIFICANT CHANGE UP (ref 32–36)
MCV RBC AUTO: 84.8 FL — SIGNIFICANT CHANGE UP (ref 80–100)
NRBC # FLD: 0 — SIGNIFICANT CHANGE UP
PHOSPHATE SERPL-MCNC: 3.9 MG/DL — SIGNIFICANT CHANGE UP (ref 2.5–4.5)
PLATELET # BLD AUTO: 357 K/UL — SIGNIFICANT CHANGE UP (ref 150–400)
PMV BLD: 9.9 FL — SIGNIFICANT CHANGE UP (ref 7–13)
POTASSIUM SERPL-MCNC: 4 MMOL/L — SIGNIFICANT CHANGE UP (ref 3.5–5.3)
POTASSIUM SERPL-SCNC: 4 MMOL/L — SIGNIFICANT CHANGE UP (ref 3.5–5.3)
RBC # BLD: 3.62 M/UL — LOW (ref 4.2–5.8)
RBC # FLD: 13.3 % — SIGNIFICANT CHANGE UP (ref 10.3–14.5)
SODIUM SERPL-SCNC: 132 MMOL/L — LOW (ref 135–145)
WBC # BLD: 11.46 K/UL — HIGH (ref 3.8–10.5)
WBC # FLD AUTO: 11.46 K/UL — HIGH (ref 3.8–10.5)

## 2018-06-09 PROCEDURE — 99233 SBSQ HOSP IP/OBS HIGH 50: CPT | Mod: GC

## 2018-06-09 RX ORDER — MAGNESIUM HYDROXIDE 400 MG/1
30 TABLET, CHEWABLE ORAL DAILY
Qty: 0 | Refills: 0 | Status: DISCONTINUED | OUTPATIENT
Start: 2018-06-09 | End: 2018-06-13

## 2018-06-09 RX ORDER — ENOXAPARIN SODIUM 100 MG/ML
70 INJECTION SUBCUTANEOUS EVERY 12 HOURS
Qty: 0 | Refills: 0 | Status: DISCONTINUED | OUTPATIENT
Start: 2018-06-09 | End: 2018-06-10

## 2018-06-09 RX ADMIN — MORPHINE SULFATE 15 MILLIGRAM(S): 50 CAPSULE, EXTENDED RELEASE ORAL at 17:18

## 2018-06-09 RX ADMIN — SENNA PLUS 2 TABLET(S): 8.6 TABLET ORAL at 22:26

## 2018-06-09 RX ADMIN — MORPHINE SULFATE 4 MILLIGRAM(S): 50 CAPSULE, EXTENDED RELEASE ORAL at 00:40

## 2018-06-09 RX ADMIN — INSULIN GLARGINE 45 UNIT(S): 100 INJECTION, SOLUTION SUBCUTANEOUS at 22:26

## 2018-06-09 RX ADMIN — Medication 4 UNIT(S): at 18:29

## 2018-06-09 RX ADMIN — ENOXAPARIN SODIUM 70 MILLIGRAM(S): 100 INJECTION SUBCUTANEOUS at 17:18

## 2018-06-09 RX ADMIN — Medication 4 UNIT(S): at 13:31

## 2018-06-09 RX ADMIN — Medication 100 MILLIGRAM(S): at 13:31

## 2018-06-09 RX ADMIN — Medication 100 MILLIGRAM(S): at 22:26

## 2018-06-09 RX ADMIN — TAMSULOSIN HYDROCHLORIDE 0.4 MILLIGRAM(S): 0.4 CAPSULE ORAL at 22:26

## 2018-06-09 RX ADMIN — MORPHINE SULFATE 15 MILLIGRAM(S): 50 CAPSULE, EXTENDED RELEASE ORAL at 18:18

## 2018-06-09 RX ADMIN — ATORVASTATIN CALCIUM 40 MILLIGRAM(S): 80 TABLET, FILM COATED ORAL at 22:26

## 2018-06-09 RX ADMIN — Medication 100 MILLIGRAM(S): at 06:30

## 2018-06-09 RX ADMIN — MORPHINE SULFATE 4 MILLIGRAM(S): 50 CAPSULE, EXTENDED RELEASE ORAL at 00:55

## 2018-06-09 RX ADMIN — Medication 4 UNIT(S): at 09:08

## 2018-06-09 RX ADMIN — Medication 100 MILLIGRAM(S): at 17:19

## 2018-06-09 RX ADMIN — ENOXAPARIN SODIUM 70 MILLIGRAM(S): 100 INJECTION SUBCUTANEOUS at 06:30

## 2018-06-09 RX ADMIN — POLYETHYLENE GLYCOL 3350 17 GRAM(S): 17 POWDER, FOR SOLUTION ORAL at 06:31

## 2018-06-09 RX ADMIN — Medication 75 MICROGRAM(S): at 06:30

## 2018-06-09 NOTE — PROGRESS NOTE ADULT - PROBLEM SELECTOR PLAN 10
DVT ppx: Full A/C w/ lovenox  PT: pending DVT ppx: Full A/C w/ lovenox given splenic vein thrombus      - H/h is downtrending, will hold restarting elequis for now  PT: pending

## 2018-06-09 NOTE — PROGRESS NOTE ADULT - SUBJECTIVE AND OBJECTIVE BOX
Patient is a 78y old  Male who presents with a chief complaint of Abnormal Labs (08 Jun 2018 17:37)      Briefly: 78 year-old M   Hx:        - CAD s/p CABG in 2010 and normal Nuc ST in 3/2018,        - Remote hx of diffuse large B cell lymphoma s/p chemo and RT in 2013       - HTN, Hypothyroidism, BPH, and Diabetes  Pw:  abnormal labs at presurgical testing      - Increasing SOB and weakness since February      - Negative cardiac workup, and negative pulmonary workup      - Saw oncology in May and found to have elevated D-dimer, sent to ED ([facility])           - CTA no PE but multiple liver lesions/metastatic disease.            - MRI: pancreatic tail mass, left adrenal gland mass, liver metastasis and a splenic vein thrombosis.            - Discharged from the hospital on Lovenox        - Has also had progressive weakness, loss of appetite, weight loss, fatigue, recent fainting spell, stomach pain, constipation       - On day of current admission           - Planned IR biopsy of the liver mets.            - Admitted for abnormal lab results prior to biopsy  - IR biopsy taken.   - Planned d/c on Sunday with resolution of electrolyte abnormalities and assurance that he isn't bleeding post-op  Interval Event: No acute events  Subjective: Continued anorexia secondary to tastelessness of food. Concern for constipation and requesting milk of magnesia. Still dizzy when sitting up. No dysuria. No f/c. Abdominal pain persistent.      PHYSICAL EXAM:  Vitals: T(F): 98.1  HR: 86  BP: 137/75  RR: 18  SpO2: 95% on RA  GENERAL: NAD, cachetic  HEAD:  Atraumatic, Normocephalic, Mouth is dry  EYES: EOMI, PERRLA, conjunctiva and sclera clear  CHEST/LUNG: There are crackles most prominent in the RLL, unchanged  HEART: Regular rate and rhythm; No murmurs, rubs, or gallops  ABDOMEN: Soft, Bowel sounds present. Distended, Mild tenderness, more pronounced on the left  EXTREMITIES: WWP no edema  PSYCH: Alert, oriented. depressed affect  NEUROLOGY: non-focal  SKIN: No rashes or lesions    LABS:  CAPILLARY BLOOD GLUCOSE      POCT Blood Glucose.: 127 mg/dL (08 Jun 2018 22:16)  POCT Blood Glucose.: 138 mg/dL (08 Jun 2018 18:13)  POCT Blood Glucose.: 100 mg/dL (08 Jun 2018 14:17)  POCT Blood Glucose.: 78 mg/dL (08 Jun 2018 10:48)    I&O's Summary                            9.9    11.46 )-----------( 357      ( 09 Jun 2018 06:20 )             30.7     WBC Trend: 11.46<--, 11.99<--, 13.05<--  06-08    131<L>  |  93<L>  |  17  ----------------------------<  94  4.7   |  25  |  0.91    Ca    9.0      08 Jun 2018 05:30  Phos  3.8     06-08  Mg     2.3     06-08      Creatinine Trend: 0.91<--, 0.87<--, 0.93<--, 1.00<--, 1.07<--  PT/INR - ( 08 Jun 2018 05:30 )   PT: 13.8 SEC;   INR: 1.20          PTT - ( 08 Jun 2018 05:30 )  PTT:25.6 SEC              MEDICATIONS  (STANDING):  atorvastatin 40 milliGRAM(s) Oral at bedtime  dextrose 5%. 1000 milliLiter(s) (50 mL/Hr) IV Continuous <Continuous>  dextrose 50% Injectable 12.5 Gram(s) IV Push once  dextrose 50% Injectable 25 Gram(s) IV Push once  dextrose 50% Injectable 25 Gram(s) IV Push once  docusate sodium 100 milliGRAM(s) Oral three times a day  enoxaparin Injectable 70 milliGRAM(s) SubCutaneous every 12 hours  insulin glargine Injectable (LANTUS) 45 Unit(s) SubCutaneous at bedtime  insulin lispro (HumaLOG) corrective regimen sliding scale   SubCutaneous Before meals and at bedtime  insulin lispro Injectable (HumaLOG) 4 Unit(s) SubCutaneous three times a day before meals  levothyroxine 75 MICROGram(s) Oral daily  metoprolol tartrate 100 milliGRAM(s) Oral two times a day  morphine ER Tablet 15 milliGRAM(s) Oral two times a day  polyethylene glycol 3350 17 Gram(s) Oral two times a day  senna 2 Tablet(s) Oral at bedtime  sodium chloride 0.9%. 1000 milliLiter(s) (100 mL/Hr) IV Continuous <Continuous>  tamsulosin 0.4 milliGRAM(s) Oral at bedtime    MEDICATIONS  (PRN):  dextrose 40% Gel 15 Gram(s) Oral once PRN Blood Glucose LESS THAN 70 milliGRAM(s)/deciliter  glucagon  Injectable 1 milliGRAM(s) IntraMuscular once PRN Glucose LESS THAN 70 milligrams/deciliter  magnesium hydroxide Suspension 30 milliLiter(s) Oral daily PRN Constipation  morphine  - Injectable 4 milliGRAM(s) IV Push every 4 hours PRN Severe Pain (7 - 10)  oxyCODONE    IR 5 milliGRAM(s) Oral every 4 hours PRN Moderate Pain (4 - 6) Patient is a 78y old  Male who presents with a chief complaint of Abnormal Labs (08 Jun 2018 17:37)      Briefly: 78 year-old M   Hx:        - CAD s/p CABG in 2010 and normal Nuc ST in 3/2018,        - Remote hx of diffuse large B cell lymphoma s/p chemo and RT in 2013       - HTN, Hypothyroidism, BPH, and Diabetes  Pw:  abnormal labs at presurgical testing      - Increasing SOB and weakness since February      - Negative cardiac workup, and negative pulmonary workup      - Saw oncology in May and found to have elevated D-dimer, sent to ED ([facility])           - CTA no PE but multiple liver lesions/metastatic disease.            - MRI: pancreatic tail mass, left adrenal gland mass, liver metastasis and a splenic vein thrombosis.            - Discharged from the hospital on Lovenox        - Has also had progressive weakness, loss of appetite, weight loss, fatigue, recent fainting spell, stomach pain, constipation       - On day of current admission           - Planned IR biopsy of the liver mets.            - Admitted for abnormal lab results prior to biopsy  - IR biopsy taken.   - Planned d/c on Sunday with resolution of electrolyte abnormalities and assurance that he isn't bleeding post-op  Interval Event: No acute events  Subjective: Continued anorexia secondary to tastelessness of food. Concern for constipation and requesting milk of magnesia. Still dizzy when sitting up. No dysuria. No f/c. Abdominal pain persistent.      PHYSICAL EXAM:  Vitals: T(F): 98.1  HR: 86  BP: 137/75  RR: 18  SpO2: 95% on RA  GENERAL: NAD, cachetic  HEAD:  Atraumatic, Normocephalic, Mouth is dry  EYES: EOMI, PERRLA, conjunctiva and sclera clear  CHEST/LUNG: There are crackles most prominent in the RLL, unchanged  HEART: Regular rate and rhythm; No murmurs, rubs, or gallops  ABDOMEN: Soft, Bowel sounds present. Distended, Mild tenderness, more pronounced on the left  EXTREMITIES: WWP no edema  PSYCH: Alert, oriented. depressed affect  NEUROLOGY: non-focal  SKIN: No rashes or lesions    LABS:  CAPILLARY BLOOD GLUCOSE      POCT Blood Glucose.: 127 mg/dL (08 Jun 2018 22:16)  POCT Blood Glucose.: 138 mg/dL (08 Jun 2018 18:13)  POCT Blood Glucose.: 100 mg/dL (08 Jun 2018 14:17)  POCT Blood Glucose.: 78 mg/dL (08 Jun 2018 10:48)    I&O's Summary                            9.9    11.46 )-----------( 357      ( 09 Jun 2018 06:20 )             30.7     WBC Trend: 11.46<--, 11.99<--, 13.05<--  06-08    131<L>  |  93<L>  |  17  ----------------------------<  94  4.7   |  25  |  0.91    Ca    9.0      08 Jun 2018 05:30  Phos  3.8     06-08  Mg     2.3     06-08      Creatinine Trend: 0.91<--, 0.87<--, 0.93<--, 1.00<--, 1.07<--  PT/INR - ( 08 Jun 2018 05:30 )   PT: 13.8 SEC;   INR: 1.20          PTT - ( 08 Jun 2018 05:30 )  PTT:25.6 SEC        MEDICATIONS  (STANDING):  atorvastatin 40 milliGRAM(s) Oral at bedtime  dextrose 5%. 1000 milliLiter(s) (50 mL/Hr) IV Continuous <Continuous>  dextrose 50% Injectable 12.5 Gram(s) IV Push once  dextrose 50% Injectable 25 Gram(s) IV Push once  dextrose 50% Injectable 25 Gram(s) IV Push once  docusate sodium 100 milliGRAM(s) Oral three times a day  enoxaparin Injectable 70 milliGRAM(s) SubCutaneous every 12 hours  insulin glargine Injectable (LANTUS) 45 Unit(s) SubCutaneous at bedtime  insulin lispro (HumaLOG) corrective regimen sliding scale   SubCutaneous Before meals and at bedtime  insulin lispro Injectable (HumaLOG) 4 Unit(s) SubCutaneous three times a day before meals  levothyroxine 75 MICROGram(s) Oral daily  metoprolol tartrate 100 milliGRAM(s) Oral two times a day  morphine ER Tablet 15 milliGRAM(s) Oral two times a day  polyethylene glycol 3350 17 Gram(s) Oral two times a day  senna 2 Tablet(s) Oral at bedtime  sodium chloride 0.9%. 1000 milliLiter(s) (100 mL/Hr) IV Continuous <Continuous>  tamsulosin 0.4 milliGRAM(s) Oral at bedtime    MEDICATIONS  (PRN):  dextrose 40% Gel 15 Gram(s) Oral once PRN Blood Glucose LESS THAN 70 milliGRAM(s)/deciliter  glucagon  Injectable 1 milliGRAM(s) IntraMuscular once PRN Glucose LESS THAN 70 milligrams/deciliter  magnesium hydroxide Suspension 30 milliLiter(s) Oral daily PRN Constipation  morphine  - Injectable 4 milliGRAM(s) IV Push every 4 hours PRN Severe Pain (7 - 10)  oxyCODONE    IR 5 milliGRAM(s) Oral every 4 hours PRN Moderate Pain (4 - 6)

## 2018-06-09 NOTE — PROGRESS NOTE ADULT - PROBLEM SELECTOR PLAN 3
- Likely multifactorial in the setting of metastatic disease with pleural effusions, deconditioning, and anemia.   - Pt also may not be taking deep breath in the setting of abd mets.   - Order incentive spirometer.   - Supplemental O2 if needed.   - EKG: NSR  - CXR: Clear lungs (6/7)  - PT - Likely multifactorial in the setting of metastatic disease with pleural effusions, deconditioning, and anemia.   - Pt also may not be taking deep breath in the setting of abd mets.   - Order incentive spirometer.   - Supplemental O2 if needed.   - EKG: NSR  - CXR: Clear lungs (6/7)  - PFTs suggestive of possible restrictive lung disease - Likely multifactorial in the setting of metastatic disease with pleural effusions, deconditioning, and anemia.   - Pt also may not be taking deep breath in the setting of abd mets.   - Order incentive spirometer.   - Supplemental O2 if needed.   - EKG: NSR, TTE and NST not suggestive of ischemia or cardiac disease contributing  - CXR: Clear lungs (6/7).  - PFTs from 5/30/2018 suggestive of possible restrictive lung disease. Will need to f/u pulmonology

## 2018-06-09 NOTE — PROGRESS NOTE ADULT - PROBLEM SELECTOR PLAN 6
- Will hold anti-hypertensives in setting of orthostatic hypotension     - c/w  NS 75 ml/hr       - Monitor for overload       - Patient has crackles but mouth is dry, no edema.   - Low salt diet.

## 2018-06-09 NOTE — PROGRESS NOTE ADULT - PROBLEM SELECTOR PLAN 5
- Splenic vein thrombosis.   - Pt was on Eliquis at home, but held it the past two days in preparation for biopsy.   - Hold lovenox this am with plan to resume post procedure  - Monitor for signs of bleeding.

## 2018-06-09 NOTE — PROGRESS NOTE ADULT - PROBLEM SELECTOR PLAN 2
- Hyponatremia and hyperkalemia.   - Unclear etiology at this time.       - Possible etiologies         -  adrenal mass (however less likely given it is not bilateral.)  AM Cortisol elevated at 25.7 ruling out adrenal insufficiency         - poor PO intake and dehydration  - f/u urine Na, K, Cl, Creatinine, and osm.   - restart diet  - Continue IVF  - Daily BMP. - Hyponatremia and hyperkalemia.   - Unclear etiology at this time.       - Possible etiologies         -  adrenal mass (however less likely given it is not bilateral.)  AM Cortisol elevated at 25.7 ruling out adrenal insufficiency         - poor PO intake and dehydration  - f/u urine Na, K, Cl, Creatinine, and osm.   - Liberalize low salt diet  - Will trial off IVF and encourage po  - Daily BMP.

## 2018-06-09 NOTE — PROGRESS NOTE ADULT - PROBLEM SELECTOR PLAN 7
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   - Monitor FS premeal and at bedtime and cover with ISS.  - Carb consistent diet.

## 2018-06-09 NOTE — PROGRESS NOTE ADULT - ATTENDING COMMENTS
Pt seen and examined. Doing well s/p IR biopsy. Reports pain control is improving w/ IV morphine. Orthostatics improved s/p IVF. Still c/o dyspnea, also c/o dysgeusia and lightheadedness on standing.   Lightheadedness- Orthostatics improved. Given dysgeusia, hearing complaints. Rec MRI Brain to evaluate for brain mets but pt declined and doesn't think he can tolerate. CT head neg. Will get PT re-eval.   Hyponatremia- plan to hold IVF and liberalize diet. Encourage po intake. Resend urine lytes  Dyspnea- PFTs suggestive of restrictive lung disease, cardiac eval neg. Deconditioning and metastatic disease also possibly contributing. Not hypoxic at this time. Can check ambulatory O2 sat. will need pulm f/u for further eval. Encourage incentive spirometry  Splenic vein thrombus- on lovenox w/ plan to transition to eliquis. Monitor H&H. No current signs of bleeding  Liver mass- Biopsy results pending. Will need outpatient onc eval.   Pain- Will need to transition to po pain meds prior to d/c  Constipation- resolved this am. Continue bowel regimen Pt seen and examined. Doing well s/p IR biopsy. Reports pain control is improving w/ IV morphine. Orthostatics improved s/p IVF. Still c/o dyspnea, also c/o dysgeusia and lightheadedness on standing.   Exam stable. Biopsy site c/d/i, lungs clear.    Lightheadedness- Orthostatics improved. Given dysgeusia, hearing complaints. Rec MRI Brain to evaluate for brain mets but pt declined and doesn't think he can tolerate. CT head neg. Will get PT re-eval.   Hyponatremia- plan to hold IVF and liberalize diet. Encourage po intake. Resend urine lytes  Dyspnea- PFTs suggestive of restrictive lung disease, cardiac eval neg. Deconditioning and metastatic disease also possibly contributing. Not hypoxic at this time. Can check ambulatory O2 sat. will need pulm f/u for further eval. Encourage incentive spirometry  Splenic vein thrombus- on lovenox w/ plan to transition to eliquis. Monitor H&H. No current signs of bleeding  Liver mass- Biopsy results pending. Will need outpatient onc eval.   Pain- Will need to transition to po pain meds prior to d/c  Constipation- resolved this am. Continue bowel regimen

## 2018-06-09 NOTE — PROGRESS NOTE ADULT - PROBLEM SELECTOR PLAN 1
- Advanced care directives discussed with patient  - Pt interested in indicated cancer treatment   - biopsy complete, follow-up with outpatient oncologist  - Full Code

## 2018-06-10 LAB
BUN SERPL-MCNC: 11 MG/DL — SIGNIFICANT CHANGE UP (ref 7–23)
CALCIUM SERPL-MCNC: 9.1 MG/DL — SIGNIFICANT CHANGE UP (ref 8.4–10.5)
CHLORIDE SERPL-SCNC: 95 MMOL/L — LOW (ref 98–107)
CO2 SERPL-SCNC: 27 MMOL/L — SIGNIFICANT CHANGE UP (ref 22–31)
CREAT ?TM UR-MCNC: 49.6 MG/DL — SIGNIFICANT CHANGE UP
CREAT SERPL-MCNC: 0.98 MG/DL — SIGNIFICANT CHANGE UP (ref 0.5–1.3)
GLUCOSE SERPL-MCNC: 44 MG/DL — CRITICAL LOW (ref 70–99)
HCT VFR BLD CALC: 30.6 % — LOW (ref 39–50)
HGB BLD-MCNC: 10 G/DL — LOW (ref 13–17)
MAGNESIUM SERPL-MCNC: 2.3 MG/DL — SIGNIFICANT CHANGE UP (ref 1.6–2.6)
MCHC RBC-ENTMCNC: 27.5 PG — SIGNIFICANT CHANGE UP (ref 27–34)
MCHC RBC-ENTMCNC: 32.7 % — SIGNIFICANT CHANGE UP (ref 32–36)
MCV RBC AUTO: 84.3 FL — SIGNIFICANT CHANGE UP (ref 80–100)
NRBC # FLD: 0 — SIGNIFICANT CHANGE UP
OSMOLALITY UR: 283 MOSMO/KG — SIGNIFICANT CHANGE UP (ref 50–1200)
PHOSPHATE SERPL-MCNC: 3.3 MG/DL — SIGNIFICANT CHANGE UP (ref 2.5–4.5)
PLATELET # BLD AUTO: 398 K/UL — SIGNIFICANT CHANGE UP (ref 150–400)
PMV BLD: 9.8 FL — SIGNIFICANT CHANGE UP (ref 7–13)
POTASSIUM SERPL-MCNC: 4.7 MMOL/L — SIGNIFICANT CHANGE UP (ref 3.5–5.3)
POTASSIUM SERPL-SCNC: 4.7 MMOL/L — SIGNIFICANT CHANGE UP (ref 3.5–5.3)
POTASSIUM UR-SCNC: 19.7 MMOL/L — SIGNIFICANT CHANGE UP
RBC # BLD: 3.63 M/UL — LOW (ref 4.2–5.8)
RBC # FLD: 13.5 % — SIGNIFICANT CHANGE UP (ref 10.3–14.5)
SODIUM SERPL-SCNC: 134 MMOL/L — LOW (ref 135–145)
SODIUM UR-SCNC: 58 MMOL/L — SIGNIFICANT CHANGE UP
UUN UR-MCNC: 301 MG/DL — SIGNIFICANT CHANGE UP
WBC # BLD: 12.07 K/UL — HIGH (ref 3.8–10.5)
WBC # FLD AUTO: 12.07 K/UL — HIGH (ref 3.8–10.5)

## 2018-06-10 PROCEDURE — 99233 SBSQ HOSP IP/OBS HIGH 50: CPT | Mod: GC

## 2018-06-10 RX ORDER — OXYCODONE HYDROCHLORIDE 5 MG/1
5 TABLET ORAL EVERY 6 HOURS
Qty: 0 | Refills: 0 | Status: DISCONTINUED | OUTPATIENT
Start: 2018-06-10 | End: 2018-06-13

## 2018-06-10 RX ORDER — OXYCODONE HYDROCHLORIDE 5 MG/1
10 TABLET ORAL EVERY 6 HOURS
Qty: 0 | Refills: 0 | Status: DISCONTINUED | OUTPATIENT
Start: 2018-06-10 | End: 2018-06-13

## 2018-06-10 RX ORDER — INSULIN GLARGINE 100 [IU]/ML
35 INJECTION, SOLUTION SUBCUTANEOUS AT BEDTIME
Qty: 0 | Refills: 0 | Status: DISCONTINUED | OUTPATIENT
Start: 2018-06-10 | End: 2018-06-10

## 2018-06-10 RX ORDER — APIXABAN 2.5 MG/1
10 TABLET, FILM COATED ORAL EVERY 12 HOURS
Qty: 0 | Refills: 0 | Status: DISCONTINUED | OUTPATIENT
Start: 2018-06-10 | End: 2018-06-13

## 2018-06-10 RX ORDER — OXYCODONE HYDROCHLORIDE 5 MG/1
10 TABLET ORAL EVERY 4 HOURS
Qty: 0 | Refills: 0 | Status: DISCONTINUED | OUTPATIENT
Start: 2018-06-10 | End: 2018-06-10

## 2018-06-10 RX ORDER — MORPHINE SULFATE 50 MG/1
30 CAPSULE, EXTENDED RELEASE ORAL
Qty: 0 | Refills: 0 | Status: DISCONTINUED | OUTPATIENT
Start: 2018-06-10 | End: 2018-06-13

## 2018-06-10 RX ORDER — OXYCODONE HYDROCHLORIDE 5 MG/1
15 TABLET ORAL EVERY 4 HOURS
Qty: 0 | Refills: 0 | Status: DISCONTINUED | OUTPATIENT
Start: 2018-06-10 | End: 2018-06-10

## 2018-06-10 RX ORDER — INSULIN GLARGINE 100 [IU]/ML
40 INJECTION, SOLUTION SUBCUTANEOUS AT BEDTIME
Qty: 0 | Refills: 0 | Status: DISCONTINUED | OUTPATIENT
Start: 2018-06-10 | End: 2018-06-12

## 2018-06-10 RX ORDER — INSULIN LISPRO 100/ML
4 VIAL (ML) SUBCUTANEOUS
Qty: 0 | Refills: 0 | Status: DISCONTINUED | OUTPATIENT
Start: 2018-06-10 | End: 2018-06-12

## 2018-06-10 RX ADMIN — Medication 3: at 18:05

## 2018-06-10 RX ADMIN — APIXABAN 10 MILLIGRAM(S): 2.5 TABLET, FILM COATED ORAL at 18:06

## 2018-06-10 RX ADMIN — Medication 100 MILLIGRAM(S): at 22:39

## 2018-06-10 RX ADMIN — MORPHINE SULFATE 30 MILLIGRAM(S): 50 CAPSULE, EXTENDED RELEASE ORAL at 18:25

## 2018-06-10 RX ADMIN — Medication 100 MILLIGRAM(S): at 15:45

## 2018-06-10 RX ADMIN — Medication 100 MILLIGRAM(S): at 06:13

## 2018-06-10 RX ADMIN — MORPHINE SULFATE 30 MILLIGRAM(S): 50 CAPSULE, EXTENDED RELEASE ORAL at 17:25

## 2018-06-10 RX ADMIN — MORPHINE SULFATE 15 MILLIGRAM(S): 50 CAPSULE, EXTENDED RELEASE ORAL at 06:45

## 2018-06-10 RX ADMIN — Medication 75 MICROGRAM(S): at 06:12

## 2018-06-10 RX ADMIN — Medication 100 MILLIGRAM(S): at 17:25

## 2018-06-10 RX ADMIN — MORPHINE SULFATE 15 MILLIGRAM(S): 50 CAPSULE, EXTENDED RELEASE ORAL at 06:12

## 2018-06-10 RX ADMIN — ATORVASTATIN CALCIUM 40 MILLIGRAM(S): 80 TABLET, FILM COATED ORAL at 22:39

## 2018-06-10 RX ADMIN — Medication 4 UNIT(S): at 18:06

## 2018-06-10 RX ADMIN — INSULIN GLARGINE 40 UNIT(S): 100 INJECTION, SOLUTION SUBCUTANEOUS at 22:39

## 2018-06-10 RX ADMIN — POLYETHYLENE GLYCOL 3350 17 GRAM(S): 17 POWDER, FOR SOLUTION ORAL at 17:25

## 2018-06-10 RX ADMIN — MORPHINE SULFATE 4 MILLIGRAM(S): 50 CAPSULE, EXTENDED RELEASE ORAL at 11:53

## 2018-06-10 RX ADMIN — MORPHINE SULFATE 4 MILLIGRAM(S): 50 CAPSULE, EXTENDED RELEASE ORAL at 12:09

## 2018-06-10 RX ADMIN — SENNA PLUS 2 TABLET(S): 8.6 TABLET ORAL at 22:39

## 2018-06-10 RX ADMIN — Medication 2: at 13:09

## 2018-06-10 RX ADMIN — ENOXAPARIN SODIUM 70 MILLIGRAM(S): 100 INJECTION SUBCUTANEOUS at 06:12

## 2018-06-10 RX ADMIN — TAMSULOSIN HYDROCHLORIDE 0.4 MILLIGRAM(S): 0.4 CAPSULE ORAL at 22:39

## 2018-06-10 NOTE — PROGRESS NOTE ADULT - PROBLEM SELECTOR PLAN 5
- Splenic vein thrombosis.   - Pt was on Eliquis at home, but held in preparation for biopsy.   - Will restart pt on Eliquis today as there is no signs of bleeding.  (10mg BID for first 7 days then 5 mg BID)  - Monitor for signs of bleeding.

## 2018-06-10 NOTE — PROGRESS NOTE ADULT - PROBLEM SELECTOR PLAN 10
DVT ppx: Full A/C w/ Eliquis given splenic vein thrombus  PT: Full rec pending DVT ppx: Full A/C w/ Eliquis given splenic vein thrombus  PT: Full rec pending  Dispo pending PT re-eval

## 2018-06-10 NOTE — PROGRESS NOTE ADULT - PROBLEM SELECTOR PLAN 7
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   - Will d/c premeal insulin and decrease lantus to 35 units.   - Monitor FS premeal and at bedtime and cover with ISS.  - Carb consistent diet.

## 2018-06-10 NOTE — PROGRESS NOTE ADULT - PROBLEM SELECTOR PLAN 2
- Hyponatremia and hyperkalemia.    - Possible etiologies         -  adrenal mass, however, AM Cortisol elevated at 25.7 ruling out adrenal insufficiency         - More likely, poor PO intake and dehydration. Pt still stating it is hard for him to eat.   - Liberalize low salt diet. Will also add Glucerna to diet today.   - Will trial off IVF and encourage po  - Daily BMP. - Hyponatremia and hyperkalemia.    - Possible etiologies         -  adrenal mass, however, AM Cortisol elevated at 25.7 ruling out adrenal insufficiency         - More likely, poor PO intake and dehydration. Pt still stating it is hard for him to eat.   - Liberalize low salt diet. Will also add Glucerna to diet today.   - Will continue to monitor off IVF and encourage po  - Daily BMP.

## 2018-06-10 NOTE — PROVIDER CONTACT NOTE (CRITICAL VALUE NOTIFICATION) - RECOMMENDATIONS
Finger stick was done at the bedside and it was 89. Also pt was assessed and he was stable with no complications

## 2018-06-10 NOTE — PROGRESS NOTE ADULT - SUBJECTIVE AND OBJECTIVE BOX
Patient is a 78y old  Male who presents with a chief complaint of Abnormal Labs (08 Jun 2018 17:37)      SUBJECTIVE / OVERNIGHT EVENTS: No acute events overnight. Pt does state that he was having chills, followed by sweats. He also feels like he has a very dry mouth and has no appetite.     MEDICATIONS  (STANDING):  apixaban 10 milliGRAM(s) Oral every 12 hours  atorvastatin 40 milliGRAM(s) Oral at bedtime  dextrose 5%. 1000 milliLiter(s) (50 mL/Hr) IV Continuous <Continuous>  dextrose 50% Injectable 12.5 Gram(s) IV Push once  dextrose 50% Injectable 25 Gram(s) IV Push once  dextrose 50% Injectable 25 Gram(s) IV Push once  docusate sodium 100 milliGRAM(s) Oral three times a day  insulin glargine Injectable (LANTUS) 35 Unit(s) SubCutaneous at bedtime  insulin lispro (HumaLOG) corrective regimen sliding scale   SubCutaneous Before meals and at bedtime  levothyroxine 75 MICROGram(s) Oral daily  metoprolol tartrate 100 milliGRAM(s) Oral two times a day  morphine ER Tablet 15 milliGRAM(s) Oral two times a day  polyethylene glycol 3350 17 Gram(s) Oral two times a day  senna 2 Tablet(s) Oral at bedtime  tamsulosin 0.4 milliGRAM(s) Oral at bedtime    MEDICATIONS  (PRN):  dextrose 40% Gel 15 Gram(s) Oral once PRN Blood Glucose LESS THAN 70 milliGRAM(s)/deciliter  glucagon  Injectable 1 milliGRAM(s) IntraMuscular once PRN Glucose LESS THAN 70 milligrams/deciliter  magnesium hydroxide Suspension 30 milliLiter(s) Oral daily PRN Constipation  morphine  - Injectable 4 milliGRAM(s) IV Push every 4 hours PRN Severe Pain (7 - 10)  oxyCODONE    IR 5 milliGRAM(s) Oral every 4 hours PRN Moderate Pain (4 - 6)      T(C): 36.7 (06-10-18 @ 06:10), Max: 37.2 (06-09-18 @ 21:23)  HR: 83 (06-10-18 @ 06:10) (80 - 84)  BP: 117/63 (06-10-18 @ 06:10) (117/63 - 149/77)  RR: 18 (06-10-18 @ 06:10) (18 - 18)  SpO2: 100% (06-10-18 @ 06:10) (98% - 100%)  CAPILLARY BLOOD GLUCOSE      POCT Blood Glucose.: 76 mg/dL (10 Jose Francisco 2018 08:52)  POCT Blood Glucose.: 89 mg/dL (10 Jose Francisco 2018 07:46)  POCT Blood Glucose.: 84 mg/dL (10 Jose Francisco 2018 03:53)  POCT Blood Glucose.: 141 mg/dL (09 Jun 2018 22:17)  POCT Blood Glucose.: 115 mg/dL (09 Jun 2018 17:50)  POCT Blood Glucose.: 104 mg/dL (09 Jun 2018 12:11)    I&O's Summary    09 Jun 2018 07:01  -  10 Jose Francisco 2018 07:00  --------------------------------------------------------  IN: 75 mL / OUT: 950 mL / NET: -875 mL        PHYSICAL EXAM:  GENERAL: NAD, cachetic  HEAD:  Atraumatic, Normocephalic, Mouth is dry  EYES: EOMI, PERRLA, conjunctiva and sclera clear  CHEST/LUNG: There are crackles most prominent in the RLL, unchanged  HEART: Regular rate and rhythm; No murmurs, rubs, or gallops  ABDOMEN: Soft, Bowel sounds present. Distended, Mild tenderness, more pronounced on the left  EXTREMITIES: WWP no edema  PSYCH: Alert, oriented. depressed affect  NEUROLOGY: non-focal  SKIN: No rashes or lesions    LABS:  (06-10 @ 05:50)                        10.0  12.07 )-----------( 398                 30.6    Neutrophils = -- (--%)  Lymphocytes = -- (--%)  Eosinophils = -- (--%)  Basophils = -- (--%)  Monocytes = -- (--%)  Bands = --%    WBC Trend: 12.07<--, 11.46<--, 11.99<--  Hb Trend: 10.0<--, 9.9<--, 10.5<--, 11.4<--, 12.2<--  Plt Trend: 398<--, 357<--, 360<--, 392<--, 414<--  06-10    134<L>  |  95<L>  |  11  ----------------------------<  44<LL>  4.7   |  27  |  0.98    Ca    9.1      10 Jose Francisco 2018 05:50  Phos  3.3     06-10  Mg     2.3     06-10      Creatinine Trend: 0.98<--, 0.83<--, 0.91<--, 0.87<--, 0.93<--, 1.00<--          Consultant(s) Notes Reviewed:  IR    Care Discussed with Consultants/Other Providers:  IR

## 2018-06-10 NOTE — PROGRESS NOTE ADULT - ATTENDING COMMENTS
Pt seen and examined. IR biopsy results pending. Pain regimen adjusted. Will f/u PT re-eval for dispo planning. Case d/w patient and son at bedside re: advanced directives. Pt wishes to pursue full therapy for malignancy. Plans to maintain functional status by walking, eating. Will f/u oncology on discharge.

## 2018-06-10 NOTE — PROGRESS NOTE ADULT - PROBLEM SELECTOR PLAN 6
- Will hold anti-hypertensives in setting of orthostatic hypotension  - However, will c/w metoprolol in the setting of bad cardiac disease.   - recheck orthostatics today. - Will hold anti-hypertensives in setting of orthostatic hypotension  - However, will c/w metoprolol in the setting of significant CAD  - recheck orthostatics today.

## 2018-06-10 NOTE — PROGRESS NOTE ADULT - PROBLEM SELECTOR PLAN 3
- Likely multifactorial in the setting of metastatic disease with pleural effusions, poor inspiration related to abdominal metastatic disease, deconditioning, and anemia.   - c/w incentive spirometer.   - Supplemental O2 not needed at this time.  - EKG: NSR, TTE and NST not suggestive of ischemia or cardiac disease contributing  - CXR: Clear lungs (6/7).  - PFTs from 5/30/2018 suggestive of possible restrictive lung disease. Will need to f/u pulmonology

## 2018-06-10 NOTE — PROGRESS NOTE ADULT - PROBLEM SELECTOR PLAN 1
- Advanced care directives discussed with patient again today. His main GOC is living longer. If that means undergoing chemo, RT, or surgery he is ready for it.   - biopsy complete, follow-up with outpatient oncologist  - Full Code. - Advanced care directives discussed with patient again today. His main wish is living longer. If that means undergoing chemo, RT, or surgery he is ready for it.   - biopsy complete, follow-up with outpatient oncologist  - Full Code.

## 2018-06-11 LAB
ALBUMIN SERPL ELPH-MCNC: 2.8 G/DL — LOW (ref 3.3–5)
ALP SERPL-CCNC: 167 U/L — HIGH (ref 40–120)
ALT FLD-CCNC: 33 U/L — SIGNIFICANT CHANGE UP (ref 4–41)
APTT BLD: 29.2 SEC — SIGNIFICANT CHANGE UP (ref 27.5–37.4)
AST SERPL-CCNC: 45 U/L — HIGH (ref 4–40)
BASOPHILS # BLD AUTO: 0.07 K/UL — SIGNIFICANT CHANGE UP (ref 0–0.2)
BASOPHILS NFR BLD AUTO: 0.5 % — SIGNIFICANT CHANGE UP (ref 0–2)
BILIRUB SERPL-MCNC: 0.5 MG/DL — SIGNIFICANT CHANGE UP (ref 0.2–1.2)
BLD GP AB SCN SERPL QL: NEGATIVE — SIGNIFICANT CHANGE UP
BUN SERPL-MCNC: 12 MG/DL — SIGNIFICANT CHANGE UP (ref 7–23)
CALCIUM SERPL-MCNC: 9.4 MG/DL — SIGNIFICANT CHANGE UP (ref 8.4–10.5)
CHLORIDE SERPL-SCNC: 95 MMOL/L — LOW (ref 98–107)
CO2 SERPL-SCNC: 23 MMOL/L — SIGNIFICANT CHANGE UP (ref 22–31)
CREAT SERPL-MCNC: 0.88 MG/DL — SIGNIFICANT CHANGE UP (ref 0.5–1.3)
EOSINOPHIL # BLD AUTO: 1.06 K/UL — HIGH (ref 0–0.5)
EOSINOPHIL NFR BLD AUTO: 7.8 % — HIGH (ref 0–6)
GLUCOSE SERPL-MCNC: 81 MG/DL — SIGNIFICANT CHANGE UP (ref 70–99)
HCT VFR BLD CALC: 31.9 % — LOW (ref 39–50)
HGB BLD-MCNC: 10.6 G/DL — LOW (ref 13–17)
IMM GRANULOCYTES # BLD AUTO: 0.1 # — SIGNIFICANT CHANGE UP
IMM GRANULOCYTES NFR BLD AUTO: 0.7 % — SIGNIFICANT CHANGE UP (ref 0–1.5)
INR BLD: 1.47 — HIGH (ref 0.88–1.17)
LYMPHOCYTES # BLD AUTO: 2.99 K/UL — SIGNIFICANT CHANGE UP (ref 1–3.3)
LYMPHOCYTES # BLD AUTO: 22 % — SIGNIFICANT CHANGE UP (ref 13–44)
MAGNESIUM SERPL-MCNC: 2.2 MG/DL — SIGNIFICANT CHANGE UP (ref 1.6–2.6)
MCHC RBC-ENTMCNC: 27.5 PG — SIGNIFICANT CHANGE UP (ref 27–34)
MCHC RBC-ENTMCNC: 33.2 % — SIGNIFICANT CHANGE UP (ref 32–36)
MCV RBC AUTO: 82.6 FL — SIGNIFICANT CHANGE UP (ref 80–100)
MONOCYTES # BLD AUTO: 1.53 K/UL — HIGH (ref 0–0.9)
MONOCYTES NFR BLD AUTO: 11.3 % — SIGNIFICANT CHANGE UP (ref 2–14)
NEUTROPHILS # BLD AUTO: 7.83 K/UL — HIGH (ref 1.8–7.4)
NEUTROPHILS NFR BLD AUTO: 57.7 % — SIGNIFICANT CHANGE UP (ref 43–77)
NRBC # FLD: 0.02 — SIGNIFICANT CHANGE UP
PHOSPHATE SERPL-MCNC: 3.7 MG/DL — SIGNIFICANT CHANGE UP (ref 2.5–4.5)
PLATELET # BLD AUTO: 418 K/UL — HIGH (ref 150–400)
PMV BLD: 9.6 FL — SIGNIFICANT CHANGE UP (ref 7–13)
POTASSIUM SERPL-MCNC: 5 MMOL/L — SIGNIFICANT CHANGE UP (ref 3.5–5.3)
POTASSIUM SERPL-SCNC: 5 MMOL/L — SIGNIFICANT CHANGE UP (ref 3.5–5.3)
PROT SERPL-MCNC: 7.1 G/DL — SIGNIFICANT CHANGE UP (ref 6–8.3)
PROTHROM AB SERPL-ACNC: 16.5 SEC — HIGH (ref 9.8–13.1)
RBC # BLD: 3.86 M/UL — LOW (ref 4.2–5.8)
RBC # FLD: 13.9 % — SIGNIFICANT CHANGE UP (ref 10.3–14.5)
RH IG SCN BLD-IMP: NEGATIVE — SIGNIFICANT CHANGE UP
SODIUM SERPL-SCNC: 133 MMOL/L — LOW (ref 135–145)
WBC # BLD: 13.58 K/UL — HIGH (ref 3.8–10.5)
WBC # FLD AUTO: 13.58 K/UL — HIGH (ref 3.8–10.5)

## 2018-06-11 PROCEDURE — 99233 SBSQ HOSP IP/OBS HIGH 50: CPT | Mod: GC

## 2018-06-11 PROCEDURE — 71250 CT THORAX DX C-: CPT | Mod: 26

## 2018-06-11 RX ORDER — ONDANSETRON 8 MG/1
8 TABLET, FILM COATED ORAL ONCE
Qty: 0 | Refills: 0 | Status: COMPLETED | OUTPATIENT
Start: 2018-06-11 | End: 2018-06-11

## 2018-06-11 RX ORDER — ONDANSETRON 8 MG/1
2 TABLET, FILM COATED ORAL ONCE
Qty: 0 | Refills: 0 | Status: COMPLETED | OUTPATIENT
Start: 2018-06-11 | End: 2018-06-11

## 2018-06-11 RX ORDER — SIMETHICONE 80 MG/1
80 TABLET, CHEWABLE ORAL ONCE
Qty: 0 | Refills: 0 | Status: COMPLETED | OUTPATIENT
Start: 2018-06-11 | End: 2018-06-11

## 2018-06-11 RX ADMIN — MORPHINE SULFATE 30 MILLIGRAM(S): 50 CAPSULE, EXTENDED RELEASE ORAL at 17:45

## 2018-06-11 RX ADMIN — OXYCODONE HYDROCHLORIDE 10 MILLIGRAM(S): 5 TABLET ORAL at 23:00

## 2018-06-11 RX ADMIN — Medication 100 MILLIGRAM(S): at 18:26

## 2018-06-11 RX ADMIN — Medication 100 MILLIGRAM(S): at 06:33

## 2018-06-11 RX ADMIN — ONDANSETRON 2 MILLIGRAM(S): 8 TABLET, FILM COATED ORAL at 20:36

## 2018-06-11 RX ADMIN — ONDANSETRON 8 MILLIGRAM(S): 8 TABLET, FILM COATED ORAL at 15:19

## 2018-06-11 RX ADMIN — SENNA PLUS 2 TABLET(S): 8.6 TABLET ORAL at 22:31

## 2018-06-11 RX ADMIN — Medication 2: at 18:26

## 2018-06-11 RX ADMIN — Medication 4 UNIT(S): at 18:26

## 2018-06-11 RX ADMIN — APIXABAN 10 MILLIGRAM(S): 2.5 TABLET, FILM COATED ORAL at 17:45

## 2018-06-11 RX ADMIN — MORPHINE SULFATE 30 MILLIGRAM(S): 50 CAPSULE, EXTENDED RELEASE ORAL at 06:15

## 2018-06-11 RX ADMIN — Medication 4 UNIT(S): at 12:58

## 2018-06-11 RX ADMIN — OXYCODONE HYDROCHLORIDE 10 MILLIGRAM(S): 5 TABLET ORAL at 22:31

## 2018-06-11 RX ADMIN — SIMETHICONE 80 MILLIGRAM(S): 80 TABLET, CHEWABLE ORAL at 20:37

## 2018-06-11 RX ADMIN — MORPHINE SULFATE 30 MILLIGRAM(S): 50 CAPSULE, EXTENDED RELEASE ORAL at 18:45

## 2018-06-11 RX ADMIN — MORPHINE SULFATE 30 MILLIGRAM(S): 50 CAPSULE, EXTENDED RELEASE ORAL at 06:45

## 2018-06-11 RX ADMIN — MAGNESIUM HYDROXIDE 30 MILLILITER(S): 400 TABLET, CHEWABLE ORAL at 15:20

## 2018-06-11 RX ADMIN — Medication 2: at 22:33

## 2018-06-11 RX ADMIN — Medication 75 MICROGRAM(S): at 06:33

## 2018-06-11 RX ADMIN — TAMSULOSIN HYDROCHLORIDE 0.4 MILLIGRAM(S): 0.4 CAPSULE ORAL at 22:31

## 2018-06-11 RX ADMIN — Medication 4 UNIT(S): at 09:11

## 2018-06-11 RX ADMIN — APIXABAN 10 MILLIGRAM(S): 2.5 TABLET, FILM COATED ORAL at 06:33

## 2018-06-11 RX ADMIN — POLYETHYLENE GLYCOL 3350 17 GRAM(S): 17 POWDER, FOR SOLUTION ORAL at 06:33

## 2018-06-11 RX ADMIN — ATORVASTATIN CALCIUM 40 MILLIGRAM(S): 80 TABLET, FILM COATED ORAL at 22:31

## 2018-06-11 RX ADMIN — INSULIN GLARGINE 40 UNIT(S): 100 INJECTION, SOLUTION SUBCUTANEOUS at 22:32

## 2018-06-11 RX ADMIN — Medication 100 MILLIGRAM(S): at 22:31

## 2018-06-11 NOTE — PROGRESS NOTE ADULT - SUBJECTIVE AND OBJECTIVE BOX
CONTACT INFO:  Barney Jacob MD  PGY-1 | Internal Medicine  Pager: [phone]    Patient is a 78y old  Male who presents with a chief complaint of Abnormal Labs (08 Jun 2018 17:37)      SUBJECTIVE / OVERNIGHT EVENTS: No overnight events. No complaints this AM. Patient denies Fevers, chills, sweats, CP, SOB.    REVIEW OF SYSTEMS:  14-point ROS was conducted with the patient and is negative except for those listed above.      MEDICATIONS  (STANDING):  apixaban 10 milliGRAM(s) Oral every 12 hours  atorvastatin 40 milliGRAM(s) Oral at bedtime  dextrose 5%. 1000 milliLiter(s) (50 mL/Hr) IV Continuous <Continuous>  dextrose 50% Injectable 12.5 Gram(s) IV Push once  dextrose 50% Injectable 25 Gram(s) IV Push once  docusate sodium 100 milliGRAM(s) Oral three times a day  insulin glargine Injectable (LANTUS) 40 Unit(s) SubCutaneous at bedtime  insulin lispro (HumaLOG) corrective regimen sliding scale   SubCutaneous Before meals and at bedtime  insulin lispro Injectable (HumaLOG) 4 Unit(s) SubCutaneous three times a day before meals  levothyroxine 75 MICROGram(s) Oral daily  metoprolol tartrate 100 milliGRAM(s) Oral two times a day  morphine ER Tablet 30 milliGRAM(s) Oral two times a day  polyethylene glycol 3350 17 Gram(s) Oral two times a day  senna 2 Tablet(s) Oral at bedtime  tamsulosin 0.4 milliGRAM(s) Oral at bedtime    MEDICATIONS  (PRN):  dextrose 40% Gel 15 Gram(s) Oral once PRN Blood Glucose LESS THAN 70 milliGRAM(s)/deciliter  glucagon  Injectable 1 milliGRAM(s) IntraMuscular once PRN Glucose LESS THAN 70 milligrams/deciliter  magnesium hydroxide Suspension 30 milliLiter(s) Oral daily PRN Constipation  oxyCODONE    IR 5 milliGRAM(s) Oral every 6 hours PRN Moderate Pain (4 - 6)  oxyCODONE    IR 10 milliGRAM(s) Oral every 6 hours PRN Severe Pain (7 - 10)      T(C): 37 (06-10-18 @ 21:15), Max: 37.2 (06-10-18 @ 11:50)  HR: 74 (06-10-18 @ 21:15) (74 - 100)  BP: 130/61 (06-10-18 @ 21:15) (130/61 - 152/79)  RR: 18 (06-10-18 @ 21:15) (18 - 18)  SpO2: 100% (06-10-18 @ 21:15) (100% - 100%)    PHYSICAL EXAM:  GENERAL: NAD, cachetic  HEAD:  Atraumatic, Normocephalic, Mouth is dry  EYES: EOMI, PERRLA, conjunctiva and sclera clear  CHEST/LUNG: There are crackles most prominent in the RLL, unchanged  HEART: Regular rate and rhythm; No murmurs, rubs, or gallops  ABDOMEN: Soft, Bowel sounds present. Distended, Mild tenderness, more pronounced on the left  EXTREMITIES: WWP no edema  PSYCH: Alert, oriented. depressed affect  NEUROLOGY: non-focal  SKIN: No rashes or lesions    LABS:                        10.6   13.58 )-----------( 418      ( 11 Jun 2018 06:41 )             31.9     06-10    134<L>  |  95<L>  |  11  ----------------------------<  44<LL>  4.7   |  27  |  0.98    Ca    9.1      10 Jose Francisco 2018 05:50  Phos  3.3     06-10  Mg     2.3     06-10      PT/INR - ( 11 Jun 2018 06:41 )   PT: 16.5 SEC;   INR: 1.47          PTT - ( 11 Jun 2018 06:41 )  PTT:29.2 SEC        I&O's Summary      MICROBIOLOGY:    RADIOLOGY: CONTACT INFO:  Barney Jacob MD  PGY-1 | Internal Medicine  Pager: [phone]    Patient is a 78y old  Male who presents with a chief complaint of Abnormal Labs (08 Jun 2018 17:37)      SUBJECTIVE / OVERNIGHT EVENTS: No overnight events. Patient well, just c/o chronic issues including dry mouth, abdominal/rib/back pain, and some constipation x 2 days. Patient reporting new "acidity" this AM.     REVIEW OF SYSTEMS:  14-point ROS was conducted with the patient and is negative except for those listed above.      MEDICATIONS  (STANDING):  apixaban 10 milliGRAM(s) Oral every 12 hours  atorvastatin 40 milliGRAM(s) Oral at bedtime  dextrose 5%. 1000 milliLiter(s) (50 mL/Hr) IV Continuous <Continuous>  dextrose 50% Injectable 12.5 Gram(s) IV Push once  dextrose 50% Injectable 25 Gram(s) IV Push once  docusate sodium 100 milliGRAM(s) Oral three times a day  insulin glargine Injectable (LANTUS) 40 Unit(s) SubCutaneous at bedtime  insulin lispro (HumaLOG) corrective regimen sliding scale   SubCutaneous Before meals and at bedtime  insulin lispro Injectable (HumaLOG) 4 Unit(s) SubCutaneous three times a day before meals  levothyroxine 75 MICROGram(s) Oral daily  metoprolol tartrate 100 milliGRAM(s) Oral two times a day  morphine ER Tablet 30 milliGRAM(s) Oral two times a day  polyethylene glycol 3350 17 Gram(s) Oral two times a day  senna 2 Tablet(s) Oral at bedtime  tamsulosin 0.4 milliGRAM(s) Oral at bedtime    MEDICATIONS  (PRN):  dextrose 40% Gel 15 Gram(s) Oral once PRN Blood Glucose LESS THAN 70 milliGRAM(s)/deciliter  glucagon  Injectable 1 milliGRAM(s) IntraMuscular once PRN Glucose LESS THAN 70 milligrams/deciliter  magnesium hydroxide Suspension 30 milliLiter(s) Oral daily PRN Constipation  oxyCODONE    IR 5 milliGRAM(s) Oral every 6 hours PRN Moderate Pain (4 - 6)  oxyCODONE    IR 10 milliGRAM(s) Oral every 6 hours PRN Severe Pain (7 - 10)      T(C): 37 (06-10-18 @ 21:15), Max: 37.2 (06-10-18 @ 11:50)  HR: 74 (06-10-18 @ 21:15) (74 - 100)  BP: 130/61 (06-10-18 @ 21:15) (130/61 - 152/79)  RR: 18 (06-10-18 @ 21:15) (18 - 18)  SpO2: 100% (06-10-18 @ 21:15) (100% - 100%)    PHYSICAL EXAM:  GENERAL: NAD, cachetic  HEAD:  Atraumatic, Normocephalic, Mouth is dry  EYES: EOMI, PERRLA, conjunctiva and sclera clear  CHEST/LUNG: There are crackles most prominent in the RLL, unchanged  HEART: Regular rate and rhythm; No murmurs, rubs, or gallops  ABDOMEN: Soft, Bowel sounds present. Distended, Mild tenderness, more pronounced on the left  EXTREMITIES: WWP no edema  PSYCH: Alert, oriented. depressed affect  NEUROLOGY: non-focal  SKIN: No rashes or lesions    LABS:                        10.6   13.58 )-----------( 418      ( 11 Jun 2018 06:41 )             31.9     06-10    134<L>  |  95<L>  |  11  ----------------------------<  44<LL>  4.7   |  27  |  0.98    Ca    9.1      10 Jose Francisco 2018 05:50  Phos  3.3     06-10  Mg     2.3     06-10      PT/INR - ( 11 Jun 2018 06:41 )   PT: 16.5 SEC;   INR: 1.47          PTT - ( 11 Jun 2018 06:41 )  PTT:29.2 SEC        I&O's Summary      MICROBIOLOGY:    RADIOLOGY:

## 2018-06-11 NOTE — PROGRESS NOTE ADULT - PROBLEM SELECTOR PLAN 6
- Will hold anti-hypertensives in setting of orthostatic hypotension  - However, will c/w metoprolol in the setting of significant CAD  - recheck orthostatics today.

## 2018-06-11 NOTE — PROGRESS NOTE ADULT - PROBLEM SELECTOR PLAN 7
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   - Will d/c premeal insulin and decrease lantus to 40 units.   - Monitor FS premeal and at bedtime and cover with ISS.  - Carb consistent diet.

## 2018-06-11 NOTE — PROGRESS NOTE ADULT - PROBLEM SELECTOR PLAN 1
- Advanced care directives discussed with patient again today. His main wish is living longer. If that means undergoing chemo, RT, or surgery he is ready for it.   - biopsy complete, follow-up with outpatient oncologist  - Full Code. - Advanced care directives discussed with patient again today. His main wish is living longer. If that means undergoing chemo, RT, or surgery he is ready for it.   - S/p biopsy, discussed case with Heme/Onc fellow who is concerned patient may have metastatic pancreatic ca after reviewing imaging and elevated CA 19-9; recommends keeping patient admitted until bx results yield in the event chemo needs to be started.  - Malathi recommends obtaining LDH, uric acid; will collect with AM labs

## 2018-06-11 NOTE — PROGRESS NOTE ADULT - PROBLEM SELECTOR PLAN 10
DVT ppx: Full A/C w/ Eliquis given splenic vein thrombus  PT: Full rec pending  Dispo pending PT re-eval DVT ppx: Full A/C w/ Eliquis given splenic vein thrombus  PT: Full rec pending  Dispo: Heme/onc advise against discharge, would recommend keeping pt. admitted until bx results yield, patient may need chemo that may need to be started while inpatient.

## 2018-06-11 NOTE — PROGRESS NOTE ADULT - PROBLEM SELECTOR PLAN 2
- Hyponatremia and hyperkalemia.    - Possible etiologies         -  adrenal mass, however, AM Cortisol elevated at 25.7 ruling out adrenal insufficiency         - More likely, poor PO intake and dehydration. Pt still stating it is hard for him to eat.   - Liberalize low salt diet and add Glucerna   - Will continue to monitor off IVF and encourage po  - Daily BMP. - Hyponatremia and hyperkalemia.    - Possible etiologies         -  adrenal mass, however, AM Cortisol elevated at 25.7 ruling out adrenal insufficiency         - Poor PO intake and dehydration. Pt still stating it is hard for him to eat.         - However, patient with Kaci of 58, so would more strongly consider SIADH in the setting of malignancy or pain  - Will continue to monitor off IVF and encourage po intake, as well as manage pain  - Daily BMP.

## 2018-06-11 NOTE — PROGRESS NOTE ADULT - PROBLEM SELECTOR PLAN 5
- Splenic vein thrombosis.   - Pt was on Eliquis at home, but held in preparation for biopsy.   - Resumed Eliquis  (10mg BID for first 7 days then 5 mg BID)  - Monitor for signs of bleeding.

## 2018-06-12 LAB
ALBUMIN SERPL ELPH-MCNC: 3.1 G/DL — LOW (ref 3.3–5)
ALP SERPL-CCNC: 174 U/L — HIGH (ref 40–120)
ALT FLD-CCNC: 32 U/L — SIGNIFICANT CHANGE UP (ref 4–41)
APTT BLD: 28.5 SEC — SIGNIFICANT CHANGE UP (ref 27.5–37.4)
AST SERPL-CCNC: 38 U/L — SIGNIFICANT CHANGE UP (ref 4–40)
BILIRUB SERPL-MCNC: 0.4 MG/DL — SIGNIFICANT CHANGE UP (ref 0.2–1.2)
BUN SERPL-MCNC: 16 MG/DL — SIGNIFICANT CHANGE UP (ref 7–23)
CALCIUM SERPL-MCNC: 9.6 MG/DL — SIGNIFICANT CHANGE UP (ref 8.4–10.5)
CHLORIDE SERPL-SCNC: 91 MMOL/L — LOW (ref 98–107)
CO2 SERPL-SCNC: 25 MMOL/L — SIGNIFICANT CHANGE UP (ref 22–31)
CREAT SERPL-MCNC: 0.96 MG/DL — SIGNIFICANT CHANGE UP (ref 0.5–1.3)
GLUCOSE SERPL-MCNC: 120 MG/DL — HIGH (ref 70–99)
HCT VFR BLD CALC: 32.6 % — LOW (ref 39–50)
HGB BLD-MCNC: 10.5 G/DL — LOW (ref 13–17)
INR BLD: 1.6 — HIGH (ref 0.88–1.17)
LDH SERPL L TO P-CCNC: 246 U/L — HIGH (ref 135–225)
MAGNESIUM SERPL-MCNC: 2.3 MG/DL — SIGNIFICANT CHANGE UP (ref 1.6–2.6)
MCHC RBC-ENTMCNC: 27.5 PG — SIGNIFICANT CHANGE UP (ref 27–34)
MCHC RBC-ENTMCNC: 32.2 % — SIGNIFICANT CHANGE UP (ref 32–36)
MCV RBC AUTO: 85.3 FL — SIGNIFICANT CHANGE UP (ref 80–100)
NRBC # FLD: 0 — SIGNIFICANT CHANGE UP
PHOSPHATE SERPL-MCNC: 3.8 MG/DL — SIGNIFICANT CHANGE UP (ref 2.5–4.5)
PLATELET # BLD AUTO: 422 K/UL — HIGH (ref 150–400)
PMV BLD: 10 FL — SIGNIFICANT CHANGE UP (ref 7–13)
POTASSIUM SERPL-MCNC: 4.7 MMOL/L — SIGNIFICANT CHANGE UP (ref 3.5–5.3)
POTASSIUM SERPL-SCNC: 4.7 MMOL/L — SIGNIFICANT CHANGE UP (ref 3.5–5.3)
PROT SERPL-MCNC: 7.6 G/DL — SIGNIFICANT CHANGE UP (ref 6–8.3)
PROTHROM AB SERPL-ACNC: 18.6 SEC — HIGH (ref 9.8–13.1)
RBC # BLD: 3.82 M/UL — LOW (ref 4.2–5.8)
RBC # FLD: 13.7 % — SIGNIFICANT CHANGE UP (ref 10.3–14.5)
SODIUM SERPL-SCNC: 132 MMOL/L — LOW (ref 135–145)
URATE SERPL-MCNC: 3.4 MG/DL — SIGNIFICANT CHANGE UP (ref 3.4–8.8)
WBC # BLD: 13.47 K/UL — HIGH (ref 3.8–10.5)
WBC # FLD AUTO: 13.47 K/UL — HIGH (ref 3.8–10.5)

## 2018-06-12 PROCEDURE — 99233 SBSQ HOSP IP/OBS HIGH 50: CPT | Mod: GC

## 2018-06-12 RX ORDER — INSULIN LISPRO 100/ML
VIAL (ML) SUBCUTANEOUS
Qty: 0 | Refills: 0 | Status: DISCONTINUED | OUTPATIENT
Start: 2018-06-12 | End: 2018-06-13

## 2018-06-12 RX ORDER — FAMOTIDINE 10 MG/ML
20 INJECTION INTRAVENOUS
Qty: 0 | Refills: 0 | Status: DISCONTINUED | OUTPATIENT
Start: 2018-06-12 | End: 2018-06-13

## 2018-06-12 RX ORDER — INSULIN LISPRO 100/ML
3 VIAL (ML) SUBCUTANEOUS
Qty: 0 | Refills: 0 | Status: DISCONTINUED | OUTPATIENT
Start: 2018-06-12 | End: 2018-06-13

## 2018-06-12 RX ORDER — INSULIN GLARGINE 100 [IU]/ML
35 INJECTION, SOLUTION SUBCUTANEOUS AT BEDTIME
Qty: 0 | Refills: 0 | Status: DISCONTINUED | OUTPATIENT
Start: 2018-06-12 | End: 2018-06-13

## 2018-06-12 RX ORDER — INSULIN LISPRO 100/ML
VIAL (ML) SUBCUTANEOUS AT BEDTIME
Qty: 0 | Refills: 0 | Status: DISCONTINUED | OUTPATIENT
Start: 2018-06-12 | End: 2018-06-13

## 2018-06-12 RX ADMIN — MORPHINE SULFATE 30 MILLIGRAM(S): 50 CAPSULE, EXTENDED RELEASE ORAL at 18:37

## 2018-06-12 RX ADMIN — MORPHINE SULFATE 30 MILLIGRAM(S): 50 CAPSULE, EXTENDED RELEASE ORAL at 05:35

## 2018-06-12 RX ADMIN — MORPHINE SULFATE 30 MILLIGRAM(S): 50 CAPSULE, EXTENDED RELEASE ORAL at 19:07

## 2018-06-12 RX ADMIN — TAMSULOSIN HYDROCHLORIDE 0.4 MILLIGRAM(S): 0.4 CAPSULE ORAL at 21:47

## 2018-06-12 RX ADMIN — Medication 100 MILLIGRAM(S): at 05:34

## 2018-06-12 RX ADMIN — Medication 100 MILLIGRAM(S): at 05:35

## 2018-06-12 RX ADMIN — INSULIN GLARGINE 35 UNIT(S): 100 INJECTION, SOLUTION SUBCUTANEOUS at 22:27

## 2018-06-12 RX ADMIN — APIXABAN 10 MILLIGRAM(S): 2.5 TABLET, FILM COATED ORAL at 05:35

## 2018-06-12 RX ADMIN — Medication 100 MILLIGRAM(S): at 18:34

## 2018-06-12 RX ADMIN — MAGNESIUM HYDROXIDE 30 MILLILITER(S): 400 TABLET, CHEWABLE ORAL at 14:09

## 2018-06-12 RX ADMIN — MORPHINE SULFATE 30 MILLIGRAM(S): 50 CAPSULE, EXTENDED RELEASE ORAL at 05:36

## 2018-06-12 RX ADMIN — ATORVASTATIN CALCIUM 40 MILLIGRAM(S): 80 TABLET, FILM COATED ORAL at 21:47

## 2018-06-12 RX ADMIN — POLYETHYLENE GLYCOL 3350 17 GRAM(S): 17 POWDER, FOR SOLUTION ORAL at 05:35

## 2018-06-12 RX ADMIN — Medication 2: at 18:33

## 2018-06-12 RX ADMIN — APIXABAN 10 MILLIGRAM(S): 2.5 TABLET, FILM COATED ORAL at 18:33

## 2018-06-12 RX ADMIN — POLYETHYLENE GLYCOL 3350 17 GRAM(S): 17 POWDER, FOR SOLUTION ORAL at 18:34

## 2018-06-12 RX ADMIN — Medication 30 MILLILITER(S): at 01:37

## 2018-06-12 RX ADMIN — Medication 100 MILLIGRAM(S): at 14:09

## 2018-06-12 RX ADMIN — FAMOTIDINE 20 MILLIGRAM(S): 10 INJECTION INTRAVENOUS at 18:34

## 2018-06-12 RX ADMIN — Medication 75 MICROGRAM(S): at 05:35

## 2018-06-12 RX ADMIN — Medication 3 UNIT(S): at 18:33

## 2018-06-12 NOTE — PROGRESS NOTE ADULT - SUBJECTIVE AND OBJECTIVE BOX
CONTACT INFO:  Barney Jacob MD  PGY-1 | Internal Medicine  Pager: [phone]    Patient is a 78y old  Male who presents with a chief complaint of Abnormal Labs (08 Jun 2018 17:37)      SUBJECTIVE / OVERNIGHT EVENTS: No acute overnight events. Patient just had some nausea and dyspepsia for which Zofran, simethicone, and Maalox were given overnight. No new complaints this AM. Patient still endorses xerostomia. Patient denies Fevers, chills, sweats, CP, SOB.    REVIEW OF SYSTEMS:  14-point ROS was conducted with the patient and is negative except for those listed above.      MEDICATIONS  (STANDING):  apixaban 10 milliGRAM(s) Oral every 12 hours  atorvastatin 40 milliGRAM(s) Oral at bedtime  dextrose 5%. 1000 milliLiter(s) (50 mL/Hr) IV Continuous <Continuous>  dextrose 50% Injectable 12.5 Gram(s) IV Push once  dextrose 50% Injectable 25 Gram(s) IV Push once  docusate sodium 100 milliGRAM(s) Oral three times a day  insulin glargine Injectable (LANTUS) 40 Unit(s) SubCutaneous at bedtime  insulin lispro (HumaLOG) corrective regimen sliding scale   SubCutaneous Before meals and at bedtime  insulin lispro Injectable (HumaLOG) 4 Unit(s) SubCutaneous three times a day before meals  levothyroxine 75 MICROGram(s) Oral daily  metoprolol tartrate 100 milliGRAM(s) Oral two times a day  morphine ER Tablet 30 milliGRAM(s) Oral two times a day  polyethylene glycol 3350 17 Gram(s) Oral two times a day  senna 2 Tablet(s) Oral at bedtime  tamsulosin 0.4 milliGRAM(s) Oral at bedtime    MEDICATIONS  (PRN):  dextrose 40% Gel 15 Gram(s) Oral once PRN Blood Glucose LESS THAN 70 milliGRAM(s)/deciliter  glucagon  Injectable 1 milliGRAM(s) IntraMuscular once PRN Glucose LESS THAN 70 milligrams/deciliter  magnesium hydroxide Suspension 30 milliLiter(s) Oral daily PRN Constipation  oxyCODONE    IR 5 milliGRAM(s) Oral every 6 hours PRN Moderate Pain (4 - 6)  oxyCODONE    IR 10 milliGRAM(s) Oral every 6 hours PRN Severe Pain (7 - 10)      T(C): 37 (06-12-18 @ 05:24), Max: 37.1 (06-11-18 @ 17:28)  HR: 85 (06-12-18 @ 05:24) (76 - 88)  BP: 117/70 (06-12-18 @ 05:24) (112/64 - 126/68)  RR: 18 (06-12-18 @ 05:24) (17 - 18)  SpO2: 100% (06-12-18 @ 05:24) (95% - 100%)    PHYSICAL EXAM:  GENERAL: NAD, cachetic  HEAD:  Atraumatic, Normocephalic, Mouth is dry  EYES: EOMI, PERRLA, conjunctiva and sclera clear  CHEST/LUNG: There are crackles most prominent in the RLL, unchanged  HEART: Regular rate and rhythm; No murmurs, rubs, or gallops  ABDOMEN: Soft, Bowel sounds present. Distended, Mild tenderness, more pronounced on the left  EXTREMITIES: WWP no edema  PSYCH: Alert, oriented. slightly depressed affect  NEUROLOGY: non-focal  SKIN: No rashes or lesions    LABS:                        10.5   13.47 )-----------( 422      ( 12 Jun 2018 05:47 )             32.6     06-12    132<L>  |  91<L>  |  16  ----------------------------<  120<H>  4.7   |  25  |  0.96    Ca    9.6      12 Jun 2018 05:47  Phos  3.8     06-12  Mg     2.3     06-12    TPro  7.6  /  Alb  3.1<L>  /  TBili  0.4  /  DBili  x   /  AST  38  /  ALT  32  /  AlkPhos  174<H>  06-12    PT/INR - ( 12 Jun 2018 05:47 )   PT: 18.6 SEC;   INR: 1.60          PTT - ( 12 Jun 2018 05:47 )  PTT:28.5 SEC        I&O's Summary      MICROBIOLOGY:    RADIOLOGY:  < from: CT Chest No Cont (06.11.18 @ 19:46) >  INTERPRETATION:  Multiple subcentimeter scattered pulmonary nodules.    Hepatic metastatic disease.    Pancreatic mass again seen.    < end of copied text >

## 2018-06-12 NOTE — PROGRESS NOTE ADULT - PROBLEM SELECTOR PLAN 7
Pt's Hgba1c 12.6 showing uncontrolled IDDM  - C/w Lantus 40 + Humalog premeal  - Monitor FS premeal and at bedtime and cover with ISS.  - Carb consistent diet.

## 2018-06-12 NOTE — PROGRESS NOTE ADULT - PROBLEM SELECTOR PLAN 5
- Splenic vein thrombosis.   - Pt was on Eliquis at home, but held in preparation for biopsy.   - C/w Eliquis  (10mg BID for first 7 days then 5 mg BID)  - Monitor for signs of bleeding.

## 2018-06-12 NOTE — PROGRESS NOTE ADULT - PROBLEM SELECTOR PLAN 2
- Hyponatremia and hyperkalemia.    - Possible etiologies         -  adrenal mass, however, AM Cortisol elevated at 25.7 ruling out adrenal insufficiency         - Poor PO intake and dehydration. Pt still stating it is hard for him to eat.         - However, patient with Kaci of 58, so would more strongly consider SIADH in the setting of malignancy or pain  - Will continue to monitor off IVF and encourage po intake, as well as manage pain  - Daily BMP.

## 2018-06-12 NOTE — PROGRESS NOTE ADULT - PROBLEM SELECTOR PLAN 3
- Likely multifactorial in the setting of metastatic disease with pleural effusions, poor inspiration related to abdominal metastatic disease, deconditioning, and anemia.   - c/w incentive spirometer.   - Supplemental O2 not needed at this time.  - EKG: NSR, TTE and NST not suggestive of ischemia or cardiac disease contributing  - CXR: Clear lungs (6/7).  - PFTs from 5/30/2018 suggestive of possible restrictive lung disease.

## 2018-06-12 NOTE — PROGRESS NOTE ADULT - PROBLEM SELECTOR PLAN 1
- Advanced care directives discussed with patient again today. His main wish is living longer. If that means undergoing chemo, RT, or surgery he is ready for it.   - S/p biopsy, discussed case with Heme/Onc fellow who is concerned patient may have metastatic pancreatic ca after reviewing imaging and elevated CA 19-9; recommends keeping patient admitted until bx results yield in the event chemo needs to be started.  - LDH elevated, to 200s, uric acid WNL

## 2018-06-12 NOTE — PROGRESS NOTE ADULT - PROBLEM SELECTOR PLAN 6
- Will hold anti-hypertensives in setting of orthostatic hypotension  - However, will c/w metoprolol in the setting of significant CAD

## 2018-06-12 NOTE — PROGRESS NOTE ADULT - PROBLEM SELECTOR PLAN 10
DVT ppx: Full A/C w/ Eliquis given splenic vein thrombus  Dispo: Heme/onc advise against discharge, would recommend keeping pt. admitted until bx results yield, patient may need chemo that may need to be started while inpatient.

## 2018-06-13 VITALS
DIASTOLIC BLOOD PRESSURE: 49 MMHG | TEMPERATURE: 99 F | RESPIRATION RATE: 18 BRPM | HEART RATE: 77 BPM | OXYGEN SATURATION: 100 % | SYSTOLIC BLOOD PRESSURE: 109 MMHG

## 2018-06-13 DIAGNOSIS — C79.9 SECONDARY MALIGNANT NEOPLASM OF UNSPECIFIED SITE: ICD-10-CM

## 2018-06-13 LAB
BASOPHILS # BLD AUTO: 0.07 K/UL — SIGNIFICANT CHANGE UP (ref 0–0.2)
BASOPHILS NFR BLD AUTO: 0.6 % — SIGNIFICANT CHANGE UP (ref 0–2)
BUN SERPL-MCNC: 17 MG/DL — SIGNIFICANT CHANGE UP (ref 7–23)
CALCIUM SERPL-MCNC: 9.4 MG/DL — SIGNIFICANT CHANGE UP (ref 8.4–10.5)
CHLORIDE SERPL-SCNC: 93 MMOL/L — LOW (ref 98–107)
CO2 SERPL-SCNC: 26 MMOL/L — SIGNIFICANT CHANGE UP (ref 22–31)
CREAT SERPL-MCNC: 1.01 MG/DL — SIGNIFICANT CHANGE UP (ref 0.5–1.3)
EOSINOPHIL # BLD AUTO: 0.95 K/UL — HIGH (ref 0–0.5)
EOSINOPHIL NFR BLD AUTO: 8.7 % — HIGH (ref 0–6)
GLUCOSE SERPL-MCNC: 93 MG/DL — SIGNIFICANT CHANGE UP (ref 70–99)
HCT VFR BLD CALC: 29.4 % — LOW (ref 39–50)
HGB BLD-MCNC: 9.9 G/DL — LOW (ref 13–17)
IMM GRANULOCYTES # BLD AUTO: 0.04 # — SIGNIFICANT CHANGE UP
IMM GRANULOCYTES NFR BLD AUTO: 0.4 % — SIGNIFICANT CHANGE UP (ref 0–1.5)
LDH SERPL L TO P-CCNC: 197 U/L — SIGNIFICANT CHANGE UP (ref 135–225)
LYMPHOCYTES # BLD AUTO: 2.32 K/UL — SIGNIFICANT CHANGE UP (ref 1–3.3)
LYMPHOCYTES # BLD AUTO: 21.2 % — SIGNIFICANT CHANGE UP (ref 13–44)
MAGNESIUM SERPL-MCNC: 2.4 MG/DL — SIGNIFICANT CHANGE UP (ref 1.6–2.6)
MCHC RBC-ENTMCNC: 27.5 PG — SIGNIFICANT CHANGE UP (ref 27–34)
MCHC RBC-ENTMCNC: 33.7 % — SIGNIFICANT CHANGE UP (ref 32–36)
MCV RBC AUTO: 81.7 FL — SIGNIFICANT CHANGE UP (ref 80–100)
MONOCYTES # BLD AUTO: 1.25 K/UL — HIGH (ref 0–0.9)
MONOCYTES NFR BLD AUTO: 11.4 % — SIGNIFICANT CHANGE UP (ref 2–14)
NEUTROPHILS # BLD AUTO: 6.31 K/UL — SIGNIFICANT CHANGE UP (ref 1.8–7.4)
NEUTROPHILS NFR BLD AUTO: 57.7 % — SIGNIFICANT CHANGE UP (ref 43–77)
NRBC # FLD: 0 — SIGNIFICANT CHANGE UP
PHOSPHATE SERPL-MCNC: 4 MG/DL — SIGNIFICANT CHANGE UP (ref 2.5–4.5)
PLATELET # BLD AUTO: 386 K/UL — SIGNIFICANT CHANGE UP (ref 150–400)
PMV BLD: 10 FL — SIGNIFICANT CHANGE UP (ref 7–13)
POTASSIUM SERPL-MCNC: 4.6 MMOL/L — SIGNIFICANT CHANGE UP (ref 3.5–5.3)
POTASSIUM SERPL-SCNC: 4.6 MMOL/L — SIGNIFICANT CHANGE UP (ref 3.5–5.3)
RBC # BLD: 3.6 M/UL — LOW (ref 4.2–5.8)
RBC # FLD: 13.6 % — SIGNIFICANT CHANGE UP (ref 10.3–14.5)
SODIUM SERPL-SCNC: 131 MMOL/L — LOW (ref 135–145)
URATE SERPL-MCNC: 4.1 MG/DL — SIGNIFICANT CHANGE UP (ref 3.4–8.8)
WBC # BLD: 10.94 K/UL — HIGH (ref 3.8–10.5)
WBC # FLD AUTO: 10.94 K/UL — HIGH (ref 3.8–10.5)

## 2018-06-13 PROCEDURE — 99223 1ST HOSP IP/OBS HIGH 75: CPT

## 2018-06-13 PROCEDURE — 99239 HOSP IP/OBS DSCHRG MGMT >30: CPT

## 2018-06-13 RX ORDER — MORPHINE SULFATE 50 MG/1
15 CAPSULE, EXTENDED RELEASE ORAL
Qty: 0 | Refills: 0 | COMMUNITY

## 2018-06-13 RX ORDER — DEXTROSE 50 % IN WATER 50 %
12.5 SYRINGE (ML) INTRAVENOUS ONCE
Qty: 0 | Refills: 0 | Status: COMPLETED | OUTPATIENT
Start: 2018-06-13 | End: 2018-06-13

## 2018-06-13 RX ORDER — DEXTROSE 50 % IN WATER 50 %
25 SYRINGE (ML) INTRAVENOUS ONCE
Qty: 0 | Refills: 0 | Status: DISCONTINUED | OUTPATIENT
Start: 2018-06-13 | End: 2018-06-13

## 2018-06-13 RX ORDER — INSULIN GLARGINE 100 [IU]/ML
20 INJECTION, SOLUTION SUBCUTANEOUS AT BEDTIME
Qty: 0 | Refills: 0 | Status: DISCONTINUED | OUTPATIENT
Start: 2018-06-13 | End: 2018-06-13

## 2018-06-13 RX ADMIN — MORPHINE SULFATE 30 MILLIGRAM(S): 50 CAPSULE, EXTENDED RELEASE ORAL at 07:00

## 2018-06-13 RX ADMIN — Medication 3 UNIT(S): at 09:03

## 2018-06-13 RX ADMIN — Medication 12.5 GRAM(S): at 10:55

## 2018-06-13 RX ADMIN — APIXABAN 10 MILLIGRAM(S): 2.5 TABLET, FILM COATED ORAL at 06:00

## 2018-06-13 RX ADMIN — Medication 3 UNIT(S): at 13:02

## 2018-06-13 RX ADMIN — Medication 75 MICROGRAM(S): at 05:59

## 2018-06-13 RX ADMIN — Medication 100 MILLIGRAM(S): at 06:00

## 2018-06-13 RX ADMIN — FAMOTIDINE 20 MILLIGRAM(S): 10 INJECTION INTRAVENOUS at 06:00

## 2018-06-13 RX ADMIN — MORPHINE SULFATE 30 MILLIGRAM(S): 50 CAPSULE, EXTENDED RELEASE ORAL at 06:00

## 2018-06-13 NOTE — CONSULT NOTE ADULT - ASSESSMENT
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

## 2018-06-13 NOTE — PROGRESS NOTE ADULT - PROBLEM SELECTOR PROBLEM 9
Hypothyroidism

## 2018-06-13 NOTE — PROGRESS NOTE ADULT - SUBJECTIVE AND OBJECTIVE BOX
CONTACT INFO:  Barney Jacob MD  PGY-1 | Internal Medicine  Pager: [phone]    Patient is a 78y old  Male who presents with a chief complaint of Abnormal Labs (08 Jun 2018 17:37)      SUBJECTIVE / OVERNIGHT EVENTS: No overnight events. No new complaints this AM. Patient denies Fevers, chills, sweats, CP, SOB. He reports having a BM yesterday and states his pain continues to improve.    REVIEW OF SYSTEMS:  14-point ROS was conducted with the patient and is negative except for those listed above.      MEDICATIONS  (STANDING):  apixaban 10 milliGRAM(s) Oral every 12 hours  atorvastatin 40 milliGRAM(s) Oral at bedtime  dextrose 5%. 1000 milliLiter(s) (50 mL/Hr) IV Continuous <Continuous>  dextrose 50% Injectable 12.5 Gram(s) IV Push once  dextrose 50% Injectable 25 Gram(s) IV Push once  docusate sodium 100 milliGRAM(s) Oral three times a day  famotidine    Tablet 20 milliGRAM(s) Oral two times a day  insulin glargine Injectable (LANTUS) 35 Unit(s) SubCutaneous at bedtime  insulin lispro (HumaLOG) corrective regimen sliding scale   SubCutaneous three times a day before meals  insulin lispro (HumaLOG) corrective regimen sliding scale   SubCutaneous at bedtime  insulin lispro Injectable (HumaLOG) 3 Unit(s) SubCutaneous three times a day before meals  levothyroxine 75 MICROGram(s) Oral daily  metoprolol tartrate 100 milliGRAM(s) Oral two times a day  morphine ER Tablet 30 milliGRAM(s) Oral two times a day  polyethylene glycol 3350 17 Gram(s) Oral two times a day  senna 2 Tablet(s) Oral at bedtime  tamsulosin 0.4 milliGRAM(s) Oral at bedtime    MEDICATIONS  (PRN):  dextrose 40% Gel 15 Gram(s) Oral once PRN Blood Glucose LESS THAN 70 milliGRAM(s)/deciliter  glucagon  Injectable 1 milliGRAM(s) IntraMuscular once PRN Glucose LESS THAN 70 milligrams/deciliter  magnesium hydroxide Suspension 30 milliLiter(s) Oral daily PRN Constipation  oxyCODONE    IR 5 milliGRAM(s) Oral every 6 hours PRN Moderate Pain (4 - 6)  oxyCODONE    IR 10 milliGRAM(s) Oral every 6 hours PRN Severe Pain (7 - 10)      T(C): 36.9 (06-13-18 @ 05:58), Max: 37.1 (06-12-18 @ 14:22)  HR: 80 (06-13-18 @ 05:58) (74 - 83)  BP: 127/63 (06-13-18 @ 05:58) (105/66 - 129/72)  RR: 18 (06-13-18 @ 05:58) (18 - 18)  SpO2: 99% (06-13-18 @ 05:58) (96% - 100%)    PHYSICAL EXAM:  GENERAL: NAD, cachetic  HEAD:  Atraumatic, Normocephalic, Mouth is dry  EYES: EOMI, PERRLA, conjunctiva and sclera clear  CHEST/LUNG: There are crackles most prominent in the RLL, unchanged  HEART: Regular rate and rhythm; No murmurs, rubs, or gallops  ABDOMEN: Soft, Bowel sounds present. Distended, Mild tenderness, more pronounced on the left  EXTREMITIES: WWP no edema  PSYCH: Alert, oriented. normal affect  NEUROLOGY: non-focal  SKIN: No rashes or lesions    LABS:                        9.9    10.94 )-----------( 386      ( 13 Jun 2018 05:55 )             29.4     06-13    131<L>  |  93<L>  |  17  ----------------------------<  93  4.6   |  26  |  1.01    Ca    9.4      13 Jun 2018 05:55  Phos  4.0     06-13  Mg     2.4     06-13    TPro  7.6  /  Alb  3.1<L>  /  TBili  0.4  /  DBili  x   /  AST  38  /  ALT  32  /  AlkPhos  174<H>  06-12    PT/INR - ( 12 Jun 2018 05:47 )   PT: 18.6 SEC;   INR: 1.60          PTT - ( 12 Jun 2018 05:47 )  PTT:28.5 SEC        I&O's Summary    12 Jun 2018 07:01  -  13 Jun 2018 07:00  --------------------------------------------------------  IN: 550 mL / OUT: 1250 mL / NET: -700 mL    Cytopathology - Non Gyn Report (06.08.18 @ 12:25)    Cytopathology - Non Gyn Report:   ACCESSION No:  [id number]    SHYLA MCGOVERN                        2        Cytopathology Report            Final Diagnosis  LIVER, CT GUIDED CORE BIOPSY  POSITIVE FOR MALIGNANT CELLS.  Poorly differentiated adenocarcinoma, see note.    Note: Thecytology touch preparations and core biopsy show solid  nests, clusters, and single isolated malignant epithelial cells  with high nuclear to cytoplasmic ratio, irregular nuclear  contour, prominent nucleoli, and vacuolated cytoplasm  infiltrating liver parenchyma. Intracytoplasmic mucin is also  identified. Immunohistochemical studies show the malignant cells  are strongly and diffusely positive for CK7 and negative for  CK20, TTF-1, arginase, and CD45. The overall findings are  compatible with a poorly differentiated adenocarcinoma. Possible  primary sites include upper GI, pancreaticobiliary, among others.  Clinical and radiologic correlation is recommended.    The background liver shows features compatible with adjacent mass  effect. Mildmacrovesicular steatosis is also identified and  raises the possibility of an underlying fatty liver disease.  Distinction between toxic (i.e. due to alcohol or medication),  metabolic (i.e. due to obesity), or other causes of fatty liver  injury cannot be made on histologic examination alone.  Correlation with the patient's clinical and serologic findings is  recommended for further evaluation.    This case was reviewed for  at the GI and  Hepatobiliary intradepartmental consensus conference with  concurrence of the diagnosis on 06/12/18.    Report faxed to Dr. Lennon's office on 06/12/18.      MICROBIOLOGY:    RADIOLOGY:

## 2018-06-13 NOTE — CONSULT NOTE ADULT - PROBLEM SELECTOR RECOMMENDATION 9
Unknown definitive primary site of disease (pancreas vs. upper GI vs. biliary).   Patient pain well controlled. No acute organ failure at this time.   From oncologic standpoint, clear for discharge with outpatient follow up with his primary oncologist Dr. Medina, who he said he is comfortable following up with.

## 2018-06-13 NOTE — PROGRESS NOTE ADULT - PROBLEM SELECTOR PROBLEM 3
Anemia
Shortness of breath

## 2018-06-13 NOTE — PROGRESS NOTE ADULT - PROBLEM SELECTOR PROBLEM 2
Electrolyte abnormality
Shortness of breath
Electrolyte abnormality

## 2018-06-13 NOTE — PROGRESS NOTE ADULT - PROBLEM SELECTOR PROBLEM 1
Electrolyte abnormality
Liver metastasis

## 2018-06-13 NOTE — PROGRESS NOTE ADULT - ATTENDING COMMENTS
I d/w pt and his wife at bedside extensively. they want go home and declined rehab. Pt likes to follow up with his private oncologist for further workup. Currently pain is well controlled.   s/p Onc panfiloal, ok for discharge  d/c home today. time 40 min

## 2018-06-13 NOTE — PROGRESS NOTE ADULT - PROBLEM SELECTOR PLAN 10
DVT ppx: Full A/C w/ Eliquis given splenic vein thrombus  Dispo: Heme/onc advise against discharge, would recommend keeping pt. admitted as patient may need chemo that may need to be started while inpatient. DVT ppx: Full A/C w/ Eliquis given splenic vein thrombus

## 2018-06-13 NOTE — CONSULT NOTE ADULT - ATTENDING COMMENTS
This patient has had a biopsy of a liver lesion; findings are reported to show adenocarcinoma of unknown origin. He has a history of lymphoma treated by Dr Medina. He has told me that he will return to Dr Medina for his follow up ; we have offered him care at [facility] at discharge and offered to make an appointment but he declines this offer

## 2018-06-13 NOTE — PROVIDER CONTACT NOTE (OTHER) - ACTION/TREATMENT ORDERED:
MD notified, apple juice given, Dextrose IV push given as per MD orders. will continue to monitor
Apple juice given.
MD notified, IV fluids bolus will be given as per MD orders, maintenance fluids in progress

## 2018-06-13 NOTE — PROGRESS NOTE ADULT - NSHPATTENDINGPLANDISCUSS_GEN_ALL_CORE
resident and pt
resident, pt and his wife
pt and resident
patient, his family at bedside, housestaff
patient and his wife, housestaff

## 2018-06-13 NOTE — PROGRESS NOTE ADULT - PROBLEM SELECTOR PLAN 8
- C/w high intensity statin.   - DASH diet.

## 2018-06-13 NOTE — PROGRESS NOTE ADULT - PROBLEM SELECTOR PLAN 7
Pt's Hgba1c 12.6 showing uncontrolled IDDM  - C/w Lantus 40 + Humalog premeal  - Monitor FS premeal and at bedtime and cover with ISS.  - Carb consistent diet. Pt's Hgba1c 12.6 showing uncontrolled IDDM. Low FS today due to poor appetite   decrease Lantus  Humalog premeal  - Monitor FS premeal and at bedtime and cover with ISS.  - Carb consistent diet.  Pt reports he doesn't like hospital food. He can eat more when he gets home

## 2018-06-13 NOTE — CONSULT NOTE ADULT - SUBJECTIVE AND OBJECTIVE BOX
HPI:  79 y/o M with a remote hx of diffuse large B cell lymphoma s/p chemo/RT in 2003 with Dr. Medina, also with CAD s/p CABG in 2010 who presents now to the hospital after being found to have abnormal labs at presurgical testing. Patient started to have weakness and SOB in February this year. Progressively worsened, and underwent cardiac workup which was normal. He then underwent pulmonary eval with PFTs, which were also normal. He eventually went to see his oncologist in May who performed a D-Dimer that was elevated. He was also having bad pain in the epigastric area at this point radiating to the back. Because of this he was told that he needs to go into the ED. Last month he went to [facility] where he underwent CTA. It was negative for PE, but it did find that the patient had multiple liver lesions consistent with metastatic disease. MRI showed pancreatic tail mass, left adrenal gland mass, liver metastasis and a splenic vein thrombosis. He was discharged on Lovenox with a plan to follow up with his oncologist and to have IR biopsy of the liver mets. The patient was scheduled for a biopsy the day of admission, but because of his abnormal lab work the biopsy was differed and the patient was admitted. Of note, the patient does state that since getting sick he has developed progressive weakness, loss of appetite, weight loss, and fatigue.     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       PAST MEDICAL & SURGICAL HISTORY:  DLBCL 2003 s/p chemoRT  CAD (coronary artery disease) s/p 4V-CABG (2010)  Diabetes mellitus type II  Hyperlipidemia  Hypertension  S/P CABG x 4: 2010      Allergies    IV Contrast (Short breath; Flushing)    Intolerances        MEDICATIONS  (STANDING):  apixaban 10 milliGRAM(s) Oral every 12 hours  atorvastatin 40 milliGRAM(s) Oral at bedtime  dextrose 5%. 1000 milliLiter(s) (50 mL/Hr) IV Continuous <Continuous>  dextrose 50% Injectable 12.5 Gram(s) IV Push once  dextrose 50% Injectable 25 Gram(s) IV Push once  docusate sodium 100 milliGRAM(s) Oral three times a day  famotidine    Tablet 20 milliGRAM(s) Oral two times a day  insulin glargine Injectable (LANTUS) 20 Unit(s) SubCutaneous at bedtime  insulin lispro (HumaLOG) corrective regimen sliding scale   SubCutaneous three times a day before meals  insulin lispro (HumaLOG) corrective regimen sliding scale   SubCutaneous at bedtime  insulin lispro Injectable (HumaLOG) 3 Unit(s) SubCutaneous three times a day before meals  levothyroxine 75 MICROGram(s) Oral daily  metoprolol tartrate 100 milliGRAM(s) Oral two times a day  morphine ER Tablet 30 milliGRAM(s) Oral two times a day  polyethylene glycol 3350 17 Gram(s) Oral two times a day  senna 2 Tablet(s) Oral at bedtime  tamsulosin 0.4 milliGRAM(s) Oral at bedtime    MEDICATIONS  (PRN):  dextrose 40% Gel 15 Gram(s) Oral once PRN Blood Glucose LESS THAN 70 milliGRAM(s)/deciliter  glucagon  Injectable 1 milliGRAM(s) IntraMuscular once PRN Glucose LESS THAN 70 milligrams/deciliter  magnesium hydroxide Suspension 30 milliLiter(s) Oral daily PRN Constipation  oxyCODONE    IR 5 milliGRAM(s) Oral every 6 hours PRN Moderate Pain (4 - 6)  oxyCODONE    IR 10 milliGRAM(s) Oral every 6 hours PRN Severe Pain (7 - 10)      FAMILY HISTORY:  No pertinent family history in first degree relatives      SOCIAL HISTORY: Social EtOH in the past, no tobacco    REVIEW OF SYSTEMS:    CONSTITUTIONAL: No weakness, fevers or chills  EYES/ENT: No visual changes;  No vertigo or throat pain   NECK: No pain or stiffness  RESPIRATORY: PLASCENCIA+  CARDIOVASCULAR: No chest pain or palpitations  GASTROINTESTINAL: +epigastric pain in band distribution. No nausea, vomiting, or hematemesis; No diarrhea or constipation. No melena or hematochezia.  GENITOURINARY: No dysuria, frequency or hematuria  NEUROLOGICAL: No numbness or weakness  SKIN: No itching, burning, rashes, or lesions   All other review of systems is negative unless indicated above.        T(F): 98.4 (06-13-18 @ 05:58), Max: 98.7 (06-12-18 @ 14:22)  HR: 69 (06-13-18 @ 10:19)  BP: 114/68 (06-13-18 @ 10:19)  RR: 18 (06-13-18 @ 10:19)  SpO2: 97% (06-13-18 @ 10:19)  Wt(kg): --    GENERAL: NAD, well-developed  HEAD:  Atraumatic, Normocephalic  EYES: EOMI, PERRLA, conjunctiva and sclera clear  NECK: Supple, No JVD  CHEST/LUNG: Clear to auscultation bilaterally; No wheeze  HEART: Regular rate and rhythm; No murmurs, rubs, or gallops  ABDOMEN: Soft, Nontender, Nondistended; Bowel sounds present  EXTREMITIES:  2+ Peripheral Pulses, No clubbing, cyanosis, or edema  NEUROLOGY: non-focal  SKIN: No rashes or lesions                          9.9    10.94 )-----------( 386      ( 13 Jun 2018 05:55 )             29.4       06-13    131<L>  |  93<L>  |  17  ----------------------------<  93  4.6   |  26  |  1.01    Ca    9.4      13 Jun 2018 05:55  Phos  4.0     06-13  Mg     2.4     06-13    TPro  7.6  /  Alb  3.1<L>  /  TBili  0.4  /  DBili  x   /  AST  38  /  ALT  32  /  AlkPhos  174<H>  06-12      Phosphorus Level, Serum: 4.0 mg/dL (06-13 @ 05:55)  Magnesium, Serum: 2.4 mg/dL (06-13 @ 05:55)  Lactate Dehydrogenase, Serum: 197 U/L (06-13 @ 05:55)  Uric Acid, Serum: 4.1 mg/dL (06-13 @ 05:55)      PT/INR - ( 12 Jun 2018 05:47 )   PT: 18.6 SEC;   INR: 1.60          PTT - ( 12 Jun 2018 05:47 )  PTT:28.5 SEC      < from: CT Chest No Cont (06.11.18 @ 19:46) >  IMPRESSION: Numerous subcentimeter pulmonary nodules as described above   worrisome for metastatic disease.    Small right and trace left pleural effusions with bibasilar areas of   atelectasis, right greater than left demonstrating interval progression   since June 6, 2018.      < end of copied text >        < from: CT Head No Cont (06.06.18 @ 12:52) >    IMPRESSION:  Microvasculardisease.  No acute intracranial hemorrhage.      < end of copied text >        < from: CT Abdomen and Pelvis No Cont (06.06.18 @ 12:45) >    IMPRESSION:  Metastatic disease involving the liver and the lung bases. Peritoneal   carcinomatosis. Heterogeneous mass involving the gastric fundus,   pancreatic body/tail, left adrenal gland, and the splenic hilum.        < end of copied text >        Cytopathology - Non Gyn Report:   ACCESSION No:  [id number]    Final Diagnosis  LIVER, CT GUIDED CORE BIOPSY  POSITIVE FOR MALIGNANT CELLS.  Poorly differentiated adenocarcinoma, see note.    Note: Thecytology touch preparations and core biopsy show solid  nests, clusters, and single isolated malignant epithelial cells  with high nuclear to cytoplasmic ratio, irregular nuclear  contour, prominent nucleoli, and vacuolated cytoplasm  infiltrating liver parenchyma. Intracytoplasmic mucin is also  identified. Immunohistochemical studies show the malignant cells  are strongly and diffusely positive for CK7 and negative for  CK20, TTF-1, arginase, and CD45. The overall findings are  compatible with a poorly differentiated adenocarcinoma. Possible  primary sites include upper GI, pancreaticobiliary, among others.  Clinical and radiologic correlation is recommended.    The background liver shows features compatible with adjacent mass  effect. Mildmacrovesicular steatosis is also identified and  raises the possibility of an underlying fatty liver disease.  Distinction between toxic (i.e. due to alcohol or medication),  metabolic (i.e. due to obesity), or other causes of fatty liver  injury cannot be made on histologic examination alone.  Correlation with the patient's clinical and serologic findings is  recommended for further evaluation.    This case was reviewed for  at the GI and  Hepatobiliary intradepartmental consensus conference with  concurrence of the diagnosis on 06/12/18.

## 2018-06-13 NOTE — PROGRESS NOTE ADULT - PROVIDER SPECIALTY LIST ADULT
Internal Medicine

## 2018-06-13 NOTE — PROGRESS NOTE ADULT - PROBLEM SELECTOR PLAN 4
- Likely anemia of chronic disease, but may also be blood loss anemia if primary CA is colon.   - iron studies more consistent with anemia of chronic disease.   - Monitor CBC daily.
- Likely anemia of chronic disease, but may also be blood loss anemia if primary CA is colon.   - check iron studies in AM.   - Monitor CBC daily.
- Likely anemia of chronic disease, but may also be blood loss anemia if primary CA is colon.   - check iron studies in AM.   - Monitor CBC daily.
- Likely anemia of chronic disease, but may also be blood loss anemia if primary CA is colon.   - iron studies more consistent with anemia of chronic disease.   - Monitor CBC daily.
- Splenic vein thrombosis.   - Pt was on Eliquis at home, but was hold it the past two days in preparation for biopsy.   - At this time will start treatment dose of lovenox with a plan to start holding once pt is cleared for biopsy.   - Monitor for signs of bleeding.

## 2018-06-13 NOTE — PROGRESS NOTE ADULT - PROBLEM SELECTOR PLAN 1
- Advanced care directives discussed with patient again today. His main wish is living longer. If that means undergoing chemo, RT, or surgery he is ready for it.   - Liver bx positive for malignant cells, yielded as poorly differentiated adenocarcinoma likely pancreaticobiliary or GI in source  - Will c/s Onc  - LDH, uric acid ok  - Elevated CA 19-9

## 2018-06-26 ENCOUNTER — INPATIENT (INPATIENT)
Facility: HOSPITAL | Age: 78
LOS: 4 days | End: 2018-07-01
Attending: INTERNAL MEDICINE | Admitting: INTERNAL MEDICINE
Payer: COMMERCIAL

## 2018-06-26 VITALS
SYSTOLIC BLOOD PRESSURE: 92 MMHG | HEART RATE: 115 BPM | OXYGEN SATURATION: 100 % | DIASTOLIC BLOOD PRESSURE: 56 MMHG | RESPIRATION RATE: 19 BRPM | TEMPERATURE: 97 F

## 2018-06-26 DIAGNOSIS — E86.0 DEHYDRATION: ICD-10-CM

## 2018-06-26 LAB
ALBUMIN SERPL ELPH-MCNC: 2.7 G/DL — LOW (ref 3.3–5)
ALP SERPL-CCNC: 387 U/L — HIGH (ref 40–120)
ALT FLD-CCNC: 126 U/L — HIGH (ref 4–41)
AST SERPL-CCNC: 153 U/L — HIGH (ref 4–40)
BASE EXCESS BLDV CALC-SCNC: 2.3 MMOL/L — SIGNIFICANT CHANGE UP
BASOPHILS # BLD AUTO: 0.05 K/UL — SIGNIFICANT CHANGE UP (ref 0–0.2)
BASOPHILS NFR BLD AUTO: 0.3 % — SIGNIFICANT CHANGE UP (ref 0–2)
BILIRUB SERPL-MCNC: 1 MG/DL — SIGNIFICANT CHANGE UP (ref 0.2–1.2)
BLOOD GAS VENOUS - CREATININE: 1.37 MG/DL — HIGH (ref 0.5–1.3)
BUN SERPL-MCNC: 47 MG/DL — HIGH (ref 7–23)
CALCIUM SERPL-MCNC: 12.1 MG/DL — HIGH (ref 8.4–10.5)
CHLORIDE BLDV-SCNC: 104 MMOL/L — SIGNIFICANT CHANGE UP (ref 96–108)
CHLORIDE SERPL-SCNC: 97 MMOL/L — LOW (ref 98–107)
CO2 SERPL-SCNC: 26 MMOL/L — SIGNIFICANT CHANGE UP (ref 22–31)
CREAT SERPL-MCNC: 1.28 MG/DL — SIGNIFICANT CHANGE UP (ref 0.5–1.3)
EOSINOPHIL # BLD AUTO: 0.38 K/UL — SIGNIFICANT CHANGE UP (ref 0–0.5)
EOSINOPHIL NFR BLD AUTO: 2.1 % — SIGNIFICANT CHANGE UP (ref 0–6)
GAS PNL BLDV: 134 MMOL/L — LOW (ref 136–146)
GLUCOSE BLDV-MCNC: 137 — HIGH (ref 70–99)
GLUCOSE SERPL-MCNC: 127 MG/DL — HIGH (ref 70–99)
HCO3 BLDV-SCNC: 25 MMOL/L — SIGNIFICANT CHANGE UP (ref 20–27)
HCT VFR BLD CALC: 36.2 % — LOW (ref 39–50)
HCT VFR BLDV CALC: 37.8 % — LOW (ref 39–51)
HGB BLD-MCNC: 11.6 G/DL — LOW (ref 13–17)
HGB BLDV-MCNC: 12.3 G/DL — LOW (ref 13–17)
IMM GRANULOCYTES # BLD AUTO: 0.14 # — SIGNIFICANT CHANGE UP
IMM GRANULOCYTES NFR BLD AUTO: 0.8 % — SIGNIFICANT CHANGE UP (ref 0–1.5)
LACTATE BLDV-MCNC: 2.7 MMOL/L — HIGH (ref 0.5–2)
LYMPHOCYTES # BLD AUTO: 1.95 K/UL — SIGNIFICANT CHANGE UP (ref 1–3.3)
LYMPHOCYTES # BLD AUTO: 10.9 % — LOW (ref 13–44)
MCHC RBC-ENTMCNC: 27 PG — SIGNIFICANT CHANGE UP (ref 27–34)
MCHC RBC-ENTMCNC: 32 % — SIGNIFICANT CHANGE UP (ref 32–36)
MCV RBC AUTO: 84.2 FL — SIGNIFICANT CHANGE UP (ref 80–100)
MONOCYTES # BLD AUTO: 1.5 K/UL — HIGH (ref 0–0.9)
MONOCYTES NFR BLD AUTO: 8.4 % — SIGNIFICANT CHANGE UP (ref 2–14)
NEUTROPHILS # BLD AUTO: 13.88 K/UL — HIGH (ref 1.8–7.4)
NEUTROPHILS NFR BLD AUTO: 77.5 % — HIGH (ref 43–77)
NRBC # FLD: 0 — SIGNIFICANT CHANGE UP
PCO2 BLDV: 44 MMHG — SIGNIFICANT CHANGE UP (ref 41–51)
PH BLDV: 7.4 PH — SIGNIFICANT CHANGE UP (ref 7.32–7.43)
PLATELET # BLD AUTO: 288 K/UL — SIGNIFICANT CHANGE UP (ref 150–400)
PMV BLD: 10 FL — SIGNIFICANT CHANGE UP (ref 7–13)
PO2 BLDV: < 24 MMHG — LOW (ref 35–40)
POTASSIUM BLDV-SCNC: 5.5 MMOL/L — HIGH (ref 3.4–4.5)
POTASSIUM SERPL-MCNC: 4.8 MMOL/L — SIGNIFICANT CHANGE UP (ref 3.5–5.3)
POTASSIUM SERPL-SCNC: 4.8 MMOL/L — SIGNIFICANT CHANGE UP (ref 3.5–5.3)
PROT SERPL-MCNC: 7.8 G/DL — SIGNIFICANT CHANGE UP (ref 6–8.3)
RBC # BLD: 4.3 M/UL — SIGNIFICANT CHANGE UP (ref 4.2–5.8)
RBC # FLD: 14.5 % — SIGNIFICANT CHANGE UP (ref 10.3–14.5)
SAO2 % BLDV: 34.1 % — LOW (ref 60–85)
SODIUM SERPL-SCNC: 136 MMOL/L — SIGNIFICANT CHANGE UP (ref 135–145)
WBC # BLD: 17.9 K/UL — HIGH (ref 3.8–10.5)
WBC # FLD AUTO: 17.9 K/UL — HIGH (ref 3.8–10.5)

## 2018-06-26 PROCEDURE — 71045 X-RAY EXAM CHEST 1 VIEW: CPT | Mod: 26

## 2018-06-26 RX ORDER — FENTANYL CITRATE 50 UG/ML
50 INJECTION INTRAVENOUS ONCE
Qty: 0 | Refills: 0 | Status: DISCONTINUED | OUTPATIENT
Start: 2018-06-26 | End: 2018-06-26

## 2018-06-26 RX ORDER — SODIUM CHLORIDE 9 MG/ML
2000 INJECTION, SOLUTION INTRAVENOUS ONCE
Qty: 0 | Refills: 0 | Status: COMPLETED | OUTPATIENT
Start: 2018-06-26 | End: 2018-06-26

## 2018-06-26 RX ADMIN — FENTANYL CITRATE 50 MICROGRAM(S): 50 INJECTION INTRAVENOUS at 22:00

## 2018-06-26 RX ADMIN — SODIUM CHLORIDE 2000 MILLILITER(S): 9 INJECTION, SOLUTION INTRAVENOUS at 22:00

## 2018-06-26 NOTE — ED PROVIDER NOTE - OBJECTIVE STATEMENT
78M h/o HTN, HLD, DM, cad, stage IV pancreatic cancer p/w decreased PO intake since discharge from hospital 6/13. Endorses chronic abdominal pain to back, on morphine and oxy at home. Chronic SOB x 4 weeks, worse today. Sent in by Dr Parker today for hydration and admission to Dr Barraza. Denies fever.

## 2018-06-26 NOTE — ED PROVIDER NOTE - ATTENDING CONTRIBUTION TO CARE
I agree with the above H&P.  Briefly this is a 78 year old male with fairly recent dx of pancreatic ca presents with weakness and decreased po intake. concern for failure to thrive. will rule out other infectious etiologies. will hydrate and admit for further workuip. patient cachetic appearing.

## 2018-06-26 NOTE — ED ADULT NURSE NOTE - CHIEF COMPLAINT QUOTE
pt. BIBA for worsening SOB x 8 days, body pain and loss of appetite x 7 days. denies any n/v nor fever.  FS - 124. PMHx: Lymphoma, Pancreatic CA, DM, CABG

## 2018-06-26 NOTE — ED ADULT TRIAGE NOTE - CHIEF COMPLAINT QUOTE
pt. BIBA for worsening SOB x 8 days, body pain and loss of appetite. denies any n/v nor fever. FS - 124. PMHx: Lymphoma, Pancreatic CA, DM, CABG pt. BIBA for worsening SOB x 8 days, body pain and loss of appetite x 7 days. denies any n/v nor fever.  FS - 124. PMHx: Lymphoma, Pancreatic CA, DM, CABG

## 2018-06-26 NOTE — ED ADULT NURSE NOTE - OBJECTIVE STATEMENT
Pt received to [facility] Presents alert and oriented to person, place, and time w/ c/o increased SOB for last 4 weeks that worsened today. Was scheduled to start chemotherapy today but due to increased weakness and decreased appetite was unable to make appointment. Respirations appear even and unlabored. Placed on Oxygen for comfort. R AC 20 gauge IV placed. Due to pancreatic cancer pt has a continuous glucose monitor to left arm above elbow. VS noted. Will continue to monitor.

## 2018-06-27 DIAGNOSIS — D72.829 ELEVATED WHITE BLOOD CELL COUNT, UNSPECIFIED: ICD-10-CM

## 2018-06-27 DIAGNOSIS — N40.0 BENIGN PROSTATIC HYPERPLASIA WITHOUT LOWER URINARY TRACT SYMPTOMS: ICD-10-CM

## 2018-06-27 DIAGNOSIS — R74.0 NONSPECIFIC ELEVATION OF LEVELS OF TRANSAMINASE AND LACTIC ACID DEHYDROGENASE [LDH]: ICD-10-CM

## 2018-06-27 DIAGNOSIS — R62.7 ADULT FAILURE TO THRIVE: ICD-10-CM

## 2018-06-27 DIAGNOSIS — C80.1 MALIGNANT (PRIMARY) NEOPLASM, UNSPECIFIED: ICD-10-CM

## 2018-06-27 DIAGNOSIS — Z29.9 ENCOUNTER FOR PROPHYLACTIC MEASURES, UNSPECIFIED: ICD-10-CM

## 2018-06-27 DIAGNOSIS — E11.649 TYPE 2 DIABETES MELLITUS WITH HYPOGLYCEMIA WITHOUT COMA: ICD-10-CM

## 2018-06-27 DIAGNOSIS — R79.89 OTHER SPECIFIED ABNORMAL FINDINGS OF BLOOD CHEMISTRY: ICD-10-CM

## 2018-06-27 DIAGNOSIS — I10 ESSENTIAL (PRIMARY) HYPERTENSION: ICD-10-CM

## 2018-06-27 DIAGNOSIS — N17.9 ACUTE KIDNEY FAILURE, UNSPECIFIED: ICD-10-CM

## 2018-06-27 PROBLEM — C22.9 MALIGNANT NEOPLASM OF LIVER, NOT SPECIFIED AS PRIMARY OR SECONDARY: Chronic | Status: ACTIVE | Noted: 2018-06-06

## 2018-06-27 PROBLEM — C25.9 MALIGNANT NEOPLASM OF PANCREAS, UNSPECIFIED: Chronic | Status: ACTIVE | Noted: 2018-06-06

## 2018-06-27 LAB
ALBUMIN SERPL ELPH-MCNC: 2.3 G/DL — LOW (ref 3.3–5)
ALP SERPL-CCNC: 330 U/L — HIGH (ref 40–120)
ALT FLD-CCNC: 116 U/L — HIGH (ref 4–41)
AST SERPL-CCNC: 151 U/L — HIGH (ref 4–40)
BILIRUB DIRECT SERPL-MCNC: 0.4 MG/DL — HIGH (ref 0.1–0.2)
BILIRUB SERPL-MCNC: 0.9 MG/DL — SIGNIFICANT CHANGE UP (ref 0.2–1.2)
BUN SERPL-MCNC: 45 MG/DL — HIGH (ref 7–23)
CALCIUM SERPL-MCNC: 11.4 MG/DL — HIGH (ref 8.4–10.5)
CHLORIDE SERPL-SCNC: 102 MMOL/L — SIGNIFICANT CHANGE UP (ref 98–107)
CO2 SERPL-SCNC: 24 MMOL/L — SIGNIFICANT CHANGE UP (ref 22–31)
CREAT ?TM UR-MCNC: 100 MG/DL — SIGNIFICANT CHANGE UP
CREAT SERPL-MCNC: 1.09 MG/DL — SIGNIFICANT CHANGE UP (ref 0.5–1.3)
GLUCOSE SERPL-MCNC: 77 MG/DL — SIGNIFICANT CHANGE UP (ref 70–99)
HCT VFR BLD CALC: 33 % — LOW (ref 39–50)
HGB BLD-MCNC: 10.7 G/DL — LOW (ref 13–17)
LACTATE SERPL-SCNC: 1.7 MMOL/L — SIGNIFICANT CHANGE UP (ref 0.5–2)
MAGNESIUM SERPL-MCNC: 2.3 MG/DL — SIGNIFICANT CHANGE UP (ref 1.6–2.6)
MCHC RBC-ENTMCNC: 26.4 PG — LOW (ref 27–34)
MCHC RBC-ENTMCNC: 32.4 % — SIGNIFICANT CHANGE UP (ref 32–36)
MCV RBC AUTO: 81.5 FL — SIGNIFICANT CHANGE UP (ref 80–100)
NRBC # FLD: 0 — SIGNIFICANT CHANGE UP
PHOSPHATE SERPL-MCNC: 3.1 MG/DL — SIGNIFICANT CHANGE UP (ref 2.5–4.5)
PLATELET # BLD AUTO: 256 K/UL — SIGNIFICANT CHANGE UP (ref 150–400)
PMV BLD: 9.9 FL — SIGNIFICANT CHANGE UP (ref 7–13)
POTASSIUM SERPL-MCNC: 5 MMOL/L — SIGNIFICANT CHANGE UP (ref 3.5–5.3)
POTASSIUM SERPL-SCNC: 5 MMOL/L — SIGNIFICANT CHANGE UP (ref 3.5–5.3)
PROT SERPL-MCNC: 6.7 G/DL — SIGNIFICANT CHANGE UP (ref 6–8.3)
RBC # BLD: 4.05 M/UL — LOW (ref 4.2–5.8)
RBC # FLD: 14.6 % — HIGH (ref 10.3–14.5)
SODIUM SERPL-SCNC: 140 MMOL/L — SIGNIFICANT CHANGE UP (ref 135–145)
SODIUM UR-SCNC: 39 MMOL/L — SIGNIFICANT CHANGE UP
WBC # BLD: 18.63 K/UL — HIGH (ref 3.8–10.5)
WBC # FLD AUTO: 18.63 K/UL — HIGH (ref 3.8–10.5)

## 2018-06-27 PROCEDURE — 99223 1ST HOSP IP/OBS HIGH 75: CPT

## 2018-06-27 RX ORDER — MORPHINE SULFATE 50 MG/1
12 CAPSULE, EXTENDED RELEASE ORAL
Qty: 0 | Refills: 0 | COMMUNITY

## 2018-06-27 RX ORDER — METOPROLOL TARTRATE 50 MG
1 TABLET ORAL
Qty: 0 | Refills: 0 | COMMUNITY

## 2018-06-27 RX ORDER — SODIUM CHLORIDE 9 MG/ML
1000 INJECTION, SOLUTION INTRAVENOUS
Qty: 0 | Refills: 0 | Status: DISCONTINUED | OUTPATIENT
Start: 2018-06-27 | End: 2018-06-27

## 2018-06-27 RX ORDER — GLUCAGON INJECTION, SOLUTION 0.5 MG/.1ML
1 INJECTION, SOLUTION SUBCUTANEOUS ONCE
Qty: 0 | Refills: 0 | Status: DISCONTINUED | OUTPATIENT
Start: 2018-06-27 | End: 2018-07-01

## 2018-06-27 RX ORDER — HYDROMORPHONE HYDROCHLORIDE 2 MG/ML
0.5 INJECTION INTRAMUSCULAR; INTRAVENOUS; SUBCUTANEOUS EVERY 4 HOURS
Qty: 0 | Refills: 0 | Status: DISCONTINUED | OUTPATIENT
Start: 2018-06-27 | End: 2018-06-28

## 2018-06-27 RX ORDER — ZOLEDRONIC ACID 5 MG/100ML
4 INJECTION, SOLUTION INTRAVENOUS ONCE
Qty: 0 | Refills: 0 | Status: COMPLETED | OUTPATIENT
Start: 2018-06-27 | End: 2018-06-27

## 2018-06-27 RX ORDER — LEVOTHYROXINE SODIUM 125 MCG
1 TABLET ORAL
Qty: 0 | Refills: 0 | COMMUNITY

## 2018-06-27 RX ORDER — HYDROMORPHONE HYDROCHLORIDE 2 MG/ML
0.5 INJECTION INTRAMUSCULAR; INTRAVENOUS; SUBCUTANEOUS EVERY 4 HOURS
Qty: 0 | Refills: 0 | Status: DISCONTINUED | OUTPATIENT
Start: 2018-06-27 | End: 2018-06-27

## 2018-06-27 RX ORDER — SODIUM CHLORIDE 9 MG/ML
1000 INJECTION, SOLUTION INTRAVENOUS
Qty: 0 | Refills: 0 | Status: DISCONTINUED | OUTPATIENT
Start: 2018-06-27 | End: 2018-07-01

## 2018-06-27 RX ORDER — HEPARIN SODIUM 5000 [USP'U]/ML
5000 INJECTION INTRAVENOUS; SUBCUTANEOUS EVERY 8 HOURS
Qty: 0 | Refills: 0 | Status: DISCONTINUED | OUTPATIENT
Start: 2018-06-27 | End: 2018-06-27

## 2018-06-27 RX ORDER — LEVOTHYROXINE SODIUM 125 MCG
50 TABLET ORAL DAILY
Qty: 0 | Refills: 0 | Status: DISCONTINUED | OUTPATIENT
Start: 2018-06-27 | End: 2018-07-01

## 2018-06-27 RX ORDER — DOCUSATE SODIUM 100 MG
100 CAPSULE ORAL THREE TIMES A DAY
Qty: 0 | Refills: 0 | Status: DISCONTINUED | OUTPATIENT
Start: 2018-06-27 | End: 2018-07-01

## 2018-06-27 RX ORDER — DEXTROSE 50 % IN WATER 50 %
12.5 SYRINGE (ML) INTRAVENOUS ONCE
Qty: 0 | Refills: 0 | Status: DISCONTINUED | OUTPATIENT
Start: 2018-06-27 | End: 2018-07-01

## 2018-06-27 RX ORDER — INSULIN DEGLUDEC 100 U/ML
38 INJECTION, SOLUTION SUBCUTANEOUS
Qty: 0 | Refills: 0 | COMMUNITY

## 2018-06-27 RX ORDER — TAMSULOSIN HYDROCHLORIDE 0.4 MG/1
0.4 CAPSULE ORAL AT BEDTIME
Qty: 0 | Refills: 0 | Status: DISCONTINUED | OUTPATIENT
Start: 2018-06-27 | End: 2018-07-01

## 2018-06-27 RX ORDER — INSULIN ASPART 100 [IU]/ML
5 INJECTION, SOLUTION SUBCUTANEOUS
Qty: 0 | Refills: 0 | COMMUNITY

## 2018-06-27 RX ORDER — SENNA PLUS 8.6 MG/1
2 TABLET ORAL AT BEDTIME
Qty: 0 | Refills: 0 | Status: DISCONTINUED | OUTPATIENT
Start: 2018-06-27 | End: 2018-07-01

## 2018-06-27 RX ORDER — APIXABAN 2.5 MG/1
1 TABLET, FILM COATED ORAL
Qty: 0 | Refills: 0 | COMMUNITY

## 2018-06-27 RX ORDER — DEXTROSE 50 % IN WATER 50 %
15 SYRINGE (ML) INTRAVENOUS ONCE
Qty: 0 | Refills: 0 | Status: DISCONTINUED | OUTPATIENT
Start: 2018-06-27 | End: 2018-07-01

## 2018-06-27 RX ORDER — SODIUM CHLORIDE 9 MG/ML
1000 INJECTION INTRAMUSCULAR; INTRAVENOUS; SUBCUTANEOUS
Qty: 0 | Refills: 0 | Status: DISCONTINUED | OUTPATIENT
Start: 2018-06-27 | End: 2018-06-29

## 2018-06-27 RX ORDER — INSULIN LISPRO 100/ML
VIAL (ML) SUBCUTANEOUS
Qty: 0 | Refills: 0 | Status: DISCONTINUED | OUTPATIENT
Start: 2018-06-27 | End: 2018-07-01

## 2018-06-27 RX ORDER — GLIMEPIRIDE 1 MG
1 TABLET ORAL
Qty: 0 | Refills: 0 | COMMUNITY

## 2018-06-27 RX ORDER — TAMSULOSIN HYDROCHLORIDE 0.4 MG/1
1 CAPSULE ORAL
Qty: 0 | Refills: 0 | COMMUNITY

## 2018-06-27 RX ORDER — ROSUVASTATIN CALCIUM 5 MG/1
1 TABLET ORAL
Qty: 0 | Refills: 0 | COMMUNITY

## 2018-06-27 RX ORDER — MORPHINE SULFATE 50 MG/1
15 CAPSULE, EXTENDED RELEASE ORAL
Qty: 0 | Refills: 0 | Status: DISCONTINUED | OUTPATIENT
Start: 2018-06-27 | End: 2018-06-28

## 2018-06-27 RX ORDER — HYDROMORPHONE HYDROCHLORIDE 2 MG/ML
1 INJECTION INTRAMUSCULAR; INTRAVENOUS; SUBCUTANEOUS EVERY 4 HOURS
Qty: 0 | Refills: 0 | Status: DISCONTINUED | OUTPATIENT
Start: 2018-06-27 | End: 2018-06-28

## 2018-06-27 RX ORDER — DEXTROSE 50 % IN WATER 50 %
25 SYRINGE (ML) INTRAVENOUS ONCE
Qty: 0 | Refills: 0 | Status: DISCONTINUED | OUTPATIENT
Start: 2018-06-27 | End: 2018-07-01

## 2018-06-27 RX ORDER — OXYCODONE HYDROCHLORIDE 5 MG/1
1 TABLET ORAL
Qty: 0 | Refills: 0 | COMMUNITY

## 2018-06-27 RX ORDER — INSULIN DEGLUDEC 100 U/ML
50 INJECTION, SOLUTION SUBCUTANEOUS
Qty: 0 | Refills: 0 | COMMUNITY

## 2018-06-27 RX ORDER — RAMIPRIL 5 MG
1 CAPSULE ORAL
Qty: 0 | Refills: 0 | COMMUNITY

## 2018-06-27 RX ORDER — HYDROMORPHONE HYDROCHLORIDE 2 MG/ML
1 INJECTION INTRAMUSCULAR; INTRAVENOUS; SUBCUTANEOUS ONCE
Qty: 0 | Refills: 0 | Status: DISCONTINUED | OUTPATIENT
Start: 2018-06-27 | End: 2018-06-27

## 2018-06-27 RX ORDER — APIXABAN 2.5 MG/1
5 TABLET, FILM COATED ORAL EVERY 12 HOURS
Qty: 0 | Refills: 0 | Status: DISCONTINUED | OUTPATIENT
Start: 2018-06-27 | End: 2018-07-01

## 2018-06-27 RX ORDER — INSULIN LISPRO 100/ML
VIAL (ML) SUBCUTANEOUS AT BEDTIME
Qty: 0 | Refills: 0 | Status: DISCONTINUED | OUTPATIENT
Start: 2018-06-27 | End: 2018-07-01

## 2018-06-27 RX ADMIN — SODIUM CHLORIDE 125 MILLILITER(S): 9 INJECTION INTRAMUSCULAR; INTRAVENOUS; SUBCUTANEOUS at 20:47

## 2018-06-27 RX ADMIN — SODIUM CHLORIDE 75 MILLILITER(S): 9 INJECTION, SOLUTION INTRAVENOUS at 04:05

## 2018-06-27 RX ADMIN — HYDROMORPHONE HYDROCHLORIDE 1 MILLIGRAM(S): 2 INJECTION INTRAMUSCULAR; INTRAVENOUS; SUBCUTANEOUS at 01:07

## 2018-06-27 RX ADMIN — Medication 100 MILLIGRAM(S): at 22:17

## 2018-06-27 RX ADMIN — Medication 100 MILLIGRAM(S): at 14:36

## 2018-06-27 RX ADMIN — MORPHINE SULFATE 15 MILLIGRAM(S): 50 CAPSULE, EXTENDED RELEASE ORAL at 05:50

## 2018-06-27 RX ADMIN — APIXABAN 5 MILLIGRAM(S): 2.5 TABLET, FILM COATED ORAL at 18:34

## 2018-06-27 RX ADMIN — FENTANYL CITRATE 50 MICROGRAM(S): 50 INJECTION INTRAVENOUS at 00:27

## 2018-06-27 RX ADMIN — Medication 50 MICROGRAM(S): at 05:50

## 2018-06-27 RX ADMIN — HYDROMORPHONE HYDROCHLORIDE 0.5 MILLIGRAM(S): 2 INJECTION INTRAMUSCULAR; INTRAVENOUS; SUBCUTANEOUS at 18:34

## 2018-06-27 RX ADMIN — TAMSULOSIN HYDROCHLORIDE 0.4 MILLIGRAM(S): 0.4 CAPSULE ORAL at 22:17

## 2018-06-27 RX ADMIN — HYDROMORPHONE HYDROCHLORIDE 0.5 MILLIGRAM(S): 2 INJECTION INTRAMUSCULAR; INTRAVENOUS; SUBCUTANEOUS at 18:49

## 2018-06-27 RX ADMIN — ZOLEDRONIC ACID 400 MILLIGRAM(S): 5 INJECTION, SOLUTION INTRAVENOUS at 20:47

## 2018-06-27 RX ADMIN — HYDROMORPHONE HYDROCHLORIDE 1 MILLIGRAM(S): 2 INJECTION INTRAMUSCULAR; INTRAVENOUS; SUBCUTANEOUS at 00:52

## 2018-06-27 RX ADMIN — APIXABAN 5 MILLIGRAM(S): 2.5 TABLET, FILM COATED ORAL at 05:49

## 2018-06-27 RX ADMIN — Medication 100 MILLIGRAM(S): at 05:49

## 2018-06-27 RX ADMIN — SENNA PLUS 2 TABLET(S): 8.6 TABLET ORAL at 22:17

## 2018-06-27 NOTE — H&P ADULT - PROBLEM SELECTOR PLAN 6
- Pt was to start chemotherapy as outpatient on 6/26 but could not make it to the appointment 2/2 weakness  - Consult in oncologist in AM (Dr. Parker) and f/u recs for further management of his adenocarcinoma

## 2018-06-27 NOTE — H&P ADULT - ASSESSMENT
This is a 78M with history as above who presents from home with poor PO intake and FTT. patient found to have JUAN likely 2/2 poor PO intake.

## 2018-06-27 NOTE — H&P ADULT - PROBLEM SELECTOR PLAN 8
- BP here initially was a little soft but has improved with IVF but still only 110s systolic  - Will hold his PO HTN meds for now and monitor his BP, restart as needed

## 2018-06-27 NOTE — H&P ADULT - PROBLEM SELECTOR PLAN 4
- Likely 2/2 liver mets and dehydration from decreased PO intake  - Will monitor in AM, if worsening then consider further evaluation with imaging  - Will hold statin therapy for now given his elevated LFTs

## 2018-06-27 NOTE — H&P ADULT - NSHPLABSRESULTS_GEN_ALL_CORE
LABS and ADDITIONAL STUDIES:                        11.6   17.90 )-----------( 288      ( 26 Jun 2018 21:42 )             36.2     06-26    136  |  97<L>  |  47<H>  ----------------------------<  127<H>  4.8   |  26  |  1.28    Ca    12.1<H>      26 Jun 2018 21:42    TPro  7.8  /  Alb  2.7<L>  /  TBili  1.0  /  DBili  x   /  AST  153<H>  /  ALT  126<H>  /  AlkPhos  387<H>  06-26    LIVER FUNCTIONS - ( 26 Jun 2018 21:42 )  Alb: 2.7 g/dL / Pro: 7.8 g/dL / ALK PHOS: 387 u/L / ALT: 126 u/L / AST: 153 u/L / GGT: x           Lactate, VBG - 2.7    < from: Xray Chest 1 View- PORTABLE-Urgent (06.26.18 @ 22:24) >    ******PRELIMINARY REPORT******            INTERPRETATION:  Clear Lungs    < end of copied text > LABS and ADDITIONAL STUDIES:                        11.6   17.90 )-----------( 288      ( 26 Jun 2018 21:42 )             36.2     06-26    136  |  97<L>  |  47<H>  ----------------------------<  127<H>  4.8   |  26  |  1.28    Ca    12.1<H>      26 Jun 2018 21:42    TPro  7.8  /  Alb  2.7<L>  /  TBili  1.0  /  DBili  x   /  AST  153<H>  /  ALT  126<H>  /  AlkPhos  387<H>  06-26    LIVER FUNCTIONS - ( 26 Jun 2018 21:42 )  Alb: 2.7 g/dL / Pro: 7.8 g/dL / ALK PHOS: 387 u/L / ALT: 126 u/L / AST: 153 u/L / GGT: x           Lactate, VBG - 2.7    < from: Xray Chest 1 View- PORTABLE-Urgent (06.26.18 @ 22:24) >    ******PRELIMINARY REPORT******            INTERPRETATION:  Clear Lungs    < end of copied text >    EKG - Sinus tach at 109, QTc 455, Q wave III, aVF, L atrial enlargement (unchanged from prior)

## 2018-06-27 NOTE — H&P ADULT - NSHPPHYSICALEXAM_GEN_ALL_CORE
Vital Signs Last 24 Hrs  T(C): 36.7 (27 Jun 2018 03:01), Max: 37.2 (27 Jun 2018 00:27)  T(F): 98 (27 Jun 2018 03:01), Max: 98.9 (27 Jun 2018 00:27)  HR: 102 (27 Jun 2018 03:01) (98 - 116)  BP: 116/66 (27 Jun 2018 03:01) (92/56 - 119/69)  BP(mean): --  RR: 18 (27 Jun 2018 03:01) (18 - 19)  SpO2: 100% (27 Jun 2018 03:01) (100% - 100%)    GENERAL: No acute distress, frail looking  HEAD:  Atraumatic, Normocephalic  ENT: EOMI, PERRLA, conjunctiva and sclera clear, Neck supple, No JVD, dry oral mucosa  CHEST/LUNG: Clear to auscultation bilaterally; No wheeze, equal breath sounds bilaterally   BACK: No spinal tenderness  HEART: Regular rate and rhythm; No murmurs, rubs, or gallops  ABDOMEN: Soft, Nontender, Nondistended; Bowel sounds present  EXTREMITIES:  No clubbing, cyanosis, or edema  PSYCH: Nl behavior, nl affect  NEUROLOGY: AAOx3, non-focal  SKIN: Normal color, No rashes or lesions

## 2018-06-27 NOTE — ED ADULT NURSE REASSESSMENT NOTE - NS ED NURSE REASSESS COMMENT FT1
pt. resting on stretcher, appears calm and comfortable but reports 5/10 generalized abd pain, medicated as ordered, AM labs drawn and sent as ordered.

## 2018-06-27 NOTE — H&P ADULT - PMH
B-cell lymphoma, unspecified B-cell lymphoma type, unspecified body region  s/p chemo/RT  Benign prostatic hyperplasia without lower urinary tract symptoms    CAD (coronary artery disease) s/p 4V-CABG (2010)    Diabetes mellitus type II    Hyperlipidemia    Hypertension    Hypothyroidism, unspecified type    Liver cancer    Pancreatic cancer

## 2018-06-27 NOTE — H&P ADULT - HISTORY OF PRESENT ILLNESS
This is a 78M with history of recently diagnosed metastatic adenocarcinoma (suspected pancreatic, mets to liver/pulm/peritonuem, not on chemotherapy yet), CAD s/p CABG 4V, B-Cell lymphoma s/p chemo/RT, HTN, Hypothyroidism, BPH and DM2 who presents to the hospital with worsening PO intake and failure to thrive at home. Patient was recently hospitalized at [facility] from 6/6 - 6/13 and since his discharge, as per his wife, he has had very poor food/water intake and has deconditioned while at home. Said that the patient doesnt have any appetite at home and eats very small amounts of food whenever he eats, said that the patient just denies having any appetite at home. His wife said that as a result of his poor oral intake, she thinks he has lost about 10-15 lbs since coming home. Said that due to his poor food intake the patient's FS have also been low to the 60s and she has not given him any insulin for the past 1-2 days. She also said that he also has been having severe back pain that has been poorly controlled with his home pain medications and the patient is unable to get any significant amount of sleep while at home. Said that the patient was to start his chemotherapy on 6/26 but was unable to make it to his appointment due to his weakness and his oncologist therefore recommended that he come to the hospital and she therefore brought him to [facility]. Currently the patient is laying in bed, said that he has a 7/10 pain in his back with radiation to his front but his wife said that he looks comfortable after he had IV pain meds in the ED. He and his wife deny any other acute complaints.     In the ED, his vitals were T 97.4, P 115, BP 92/56, R 19, o2 sat 100% RA. His lab work was significant for leukocytosis, JUAN, and transaminitis. He was given 2L LR, fentanyl 50 mcg IVP x1, and dilaudid 1mg IVP x1. He was admitted to medicine.

## 2018-06-27 NOTE — H&P ADULT - NSHPREVIEWOFSYSTEMS_GEN_ALL_CORE
REVIEW OF SYSTEMS:    CONSTITUTIONAL: +Generalized weakness/malaise, Decreased PO intake/decreased appetite, weight loss; No fevers/chills  EYES: No vision changes or discharge  ENT: No rhinorrhea or sinus congestion  NECK: No pain or stiffness  RESPIRATORY: No cough, wheezing, hemoptysis; No shortness of breath  CARDIOVASCULAR: No chest pain or palpitations; No lower extremity edema  GASTROINTESTINAL: No abdominal or epigastric pain. No nausea, vomiting, or hematemesis; +Constipation. No melena or hematochezia.  BACK: +Back pain with radiation to abdomen  GENITOURINARY: Dark urine but no dysuria, frequency or hematuria  NEUROLOGICAL: No numbness or weakness  SKIN: No itching, burning, rashes, or lesions   All other review of systems is negative unless indicated above.

## 2018-06-27 NOTE — H&P ADULT - PROBLEM SELECTOR PLAN 2
- Likely in setting of his new metastatic adenocarcinoma  - Will hydrate as above, will encourage PO intake and monitor

## 2018-06-27 NOTE — H&P ADULT - PROBLEM SELECTOR PLAN 5
- Patient with elevated leukocytosis to 17.9k but no signs of an infection currently, suspect it is likely hemoconcentration in setting of poor PO intake  - Will monitor off abx for now

## 2018-06-27 NOTE — H&P ADULT - PROBLEM SELECTOR PLAN 10
- On eliquis as outpatient for splenic vein thrombus visualized on MRI at OSH as per previous H&P, will c/w for now  - Fall precautions

## 2018-06-27 NOTE — H&P ADULT - PROBLEM SELECTOR PLAN 7
- Patient with episodes of hypoglycemia at home 2/2 decreased PO intake, his wife has been holding his insulin at home for the past few days  - For now will place on HISS, monitor FS qAC

## 2018-06-27 NOTE — H&P ADULT - PROBLEM SELECTOR PLAN 1
- Suspect patient's JUAN is in setting of his poor PO intake for the past ~2 weeks, received 2L LR in ED, will c/w LR at 75 cc/hr and monitor his BUN/Cr in AM  - Will check a urine Na/Cr to calculate FENa  - Will hold ramipril for now given his JUAN

## 2018-06-28 LAB
ALBUMIN SERPL ELPH-MCNC: 2.2 G/DL — LOW (ref 3.3–5)
ALP SERPL-CCNC: 374 U/L — HIGH (ref 40–120)
ALT FLD-CCNC: 144 U/L — HIGH (ref 4–41)
AST SERPL-CCNC: 212 U/L — HIGH (ref 4–40)
BILIRUB DIRECT SERPL-MCNC: 0.5 MG/DL — HIGH (ref 0.1–0.2)
BILIRUB SERPL-MCNC: 1.2 MG/DL — SIGNIFICANT CHANGE UP (ref 0.2–1.2)
BUN SERPL-MCNC: 46 MG/DL — HIGH (ref 7–23)
CALCIUM SERPL-MCNC: 11.3 MG/DL — HIGH (ref 8.4–10.5)
CHLORIDE SERPL-SCNC: 102 MMOL/L — SIGNIFICANT CHANGE UP (ref 98–107)
CO2 SERPL-SCNC: 23 MMOL/L — SIGNIFICANT CHANGE UP (ref 22–31)
CREAT SERPL-MCNC: 0.97 MG/DL — SIGNIFICANT CHANGE UP (ref 0.5–1.3)
GLUCOSE SERPL-MCNC: 83 MG/DL — SIGNIFICANT CHANGE UP (ref 70–99)
HCT VFR BLD CALC: 38.4 % — LOW (ref 39–50)
HGB BLD-MCNC: 11.7 G/DL — LOW (ref 13–17)
MCHC RBC-ENTMCNC: 26.1 PG — LOW (ref 27–34)
MCHC RBC-ENTMCNC: 30.5 % — LOW (ref 32–36)
MCV RBC AUTO: 85.5 FL — SIGNIFICANT CHANGE UP (ref 80–100)
NRBC # FLD: 0.02 — SIGNIFICANT CHANGE UP
PLATELET # BLD AUTO: 245 K/UL — SIGNIFICANT CHANGE UP (ref 150–400)
PMV BLD: 10.6 FL — SIGNIFICANT CHANGE UP (ref 7–13)
POTASSIUM SERPL-MCNC: 5 MMOL/L — SIGNIFICANT CHANGE UP (ref 3.5–5.3)
POTASSIUM SERPL-SCNC: 5 MMOL/L — SIGNIFICANT CHANGE UP (ref 3.5–5.3)
PROT SERPL-MCNC: 6.4 G/DL — SIGNIFICANT CHANGE UP (ref 6–8.3)
RBC # BLD: 4.49 M/UL — SIGNIFICANT CHANGE UP (ref 4.2–5.8)
RBC # FLD: 14.6 % — HIGH (ref 10.3–14.5)
SODIUM SERPL-SCNC: 139 MMOL/L — SIGNIFICANT CHANGE UP (ref 135–145)
WBC # BLD: 16.92 K/UL — HIGH (ref 3.8–10.5)
WBC # FLD AUTO: 16.92 K/UL — HIGH (ref 3.8–10.5)

## 2018-06-28 RX ORDER — MORPHINE SULFATE 50 MG/1
15 CAPSULE, EXTENDED RELEASE ORAL
Qty: 0 | Refills: 0 | Status: DISCONTINUED | OUTPATIENT
Start: 2018-06-28 | End: 2018-06-29

## 2018-06-28 RX ORDER — HYDROMORPHONE HYDROCHLORIDE 2 MG/ML
0.5 INJECTION INTRAMUSCULAR; INTRAVENOUS; SUBCUTANEOUS EVERY 4 HOURS
Qty: 0 | Refills: 0 | Status: DISCONTINUED | OUTPATIENT
Start: 2018-06-28 | End: 2018-07-01

## 2018-06-28 RX ORDER — HYDROMORPHONE HYDROCHLORIDE 2 MG/ML
1 INJECTION INTRAMUSCULAR; INTRAVENOUS; SUBCUTANEOUS EVERY 4 HOURS
Qty: 0 | Refills: 0 | Status: DISCONTINUED | OUTPATIENT
Start: 2018-06-28 | End: 2018-07-01

## 2018-06-28 RX ADMIN — APIXABAN 5 MILLIGRAM(S): 2.5 TABLET, FILM COATED ORAL at 18:13

## 2018-06-28 RX ADMIN — APIXABAN 5 MILLIGRAM(S): 2.5 TABLET, FILM COATED ORAL at 05:38

## 2018-06-28 RX ADMIN — MORPHINE SULFATE 15 MILLIGRAM(S): 50 CAPSULE, EXTENDED RELEASE ORAL at 18:13

## 2018-06-28 RX ADMIN — Medication 100 MILLIGRAM(S): at 23:16

## 2018-06-28 RX ADMIN — HYDROMORPHONE HYDROCHLORIDE 0.5 MILLIGRAM(S): 2 INJECTION INTRAMUSCULAR; INTRAVENOUS; SUBCUTANEOUS at 03:04

## 2018-06-28 RX ADMIN — MORPHINE SULFATE 15 MILLIGRAM(S): 50 CAPSULE, EXTENDED RELEASE ORAL at 05:38

## 2018-06-28 RX ADMIN — TAMSULOSIN HYDROCHLORIDE 0.4 MILLIGRAM(S): 0.4 CAPSULE ORAL at 23:16

## 2018-06-28 RX ADMIN — Medication 50 MICROGRAM(S): at 05:38

## 2018-06-28 RX ADMIN — HYDROMORPHONE HYDROCHLORIDE 0.5 MILLIGRAM(S): 2 INJECTION INTRAMUSCULAR; INTRAVENOUS; SUBCUTANEOUS at 12:35

## 2018-06-28 RX ADMIN — HYDROMORPHONE HYDROCHLORIDE 0.5 MILLIGRAM(S): 2 INJECTION INTRAMUSCULAR; INTRAVENOUS; SUBCUTANEOUS at 02:52

## 2018-06-28 RX ADMIN — Medication 100 MILLIGRAM(S): at 05:38

## 2018-06-28 RX ADMIN — SENNA PLUS 2 TABLET(S): 8.6 TABLET ORAL at 23:17

## 2018-06-28 RX ADMIN — HYDROMORPHONE HYDROCHLORIDE 0.5 MILLIGRAM(S): 2 INJECTION INTRAMUSCULAR; INTRAVENOUS; SUBCUTANEOUS at 12:21

## 2018-06-29 DIAGNOSIS — C25.9 MALIGNANT NEOPLASM OF PANCREAS, UNSPECIFIED: ICD-10-CM

## 2018-06-29 DIAGNOSIS — G89.3 NEOPLASM RELATED PAIN (ACUTE) (CHRONIC): ICD-10-CM

## 2018-06-29 DIAGNOSIS — K59.01 SLOW TRANSIT CONSTIPATION: ICD-10-CM

## 2018-06-29 DIAGNOSIS — Z51.5 ENCOUNTER FOR PALLIATIVE CARE: ICD-10-CM

## 2018-06-29 LAB
ALBUMIN SERPL ELPH-MCNC: 1.1 G/DL — LOW (ref 3.3–5)
ALP SERPL-CCNC: 292 U/L — HIGH (ref 40–120)
ALT FLD-CCNC: 166 U/L — HIGH (ref 4–41)
AST SERPL-CCNC: 227 U/L — HIGH (ref 4–40)
BILIRUB SERPL-MCNC: 1.2 MG/DL — SIGNIFICANT CHANGE UP (ref 0.2–1.2)
BUN SERPL-MCNC: 40 MG/DL — HIGH (ref 7–23)
CALCIUM SERPL-MCNC: 9 MG/DL — SIGNIFICANT CHANGE UP (ref 8.4–10.5)
CHLORIDE SERPL-SCNC: 110 MMOL/L — HIGH (ref 98–107)
CO2 SERPL-SCNC: 14 MMOL/L — LOW (ref 22–31)
CREAT SERPL-MCNC: 0.85 MG/DL — SIGNIFICANT CHANGE UP (ref 0.5–1.3)
GLUCOSE SERPL-MCNC: 116 MG/DL — HIGH (ref 70–99)
HCT VFR BLD CALC: 31.9 % — LOW (ref 39–50)
HGB BLD-MCNC: 9.8 G/DL — LOW (ref 13–17)
MAGNESIUM SERPL-MCNC: 2.3 MG/DL — SIGNIFICANT CHANGE UP (ref 1.6–2.6)
MCHC RBC-ENTMCNC: 26.5 PG — LOW (ref 27–34)
MCHC RBC-ENTMCNC: 30.7 % — LOW (ref 32–36)
MCV RBC AUTO: 86.2 FL — SIGNIFICANT CHANGE UP (ref 80–100)
NRBC # FLD: 0.03 — SIGNIFICANT CHANGE UP
PHOSPHATE SERPL-MCNC: 2.8 MG/DL — SIGNIFICANT CHANGE UP (ref 2.5–4.5)
PLATELET # BLD AUTO: 209 K/UL — SIGNIFICANT CHANGE UP (ref 150–400)
PMV BLD: 10.2 FL — SIGNIFICANT CHANGE UP (ref 7–13)
POTASSIUM SERPL-MCNC: 5.3 MMOL/L — SIGNIFICANT CHANGE UP (ref 3.5–5.3)
POTASSIUM SERPL-SCNC: 5.3 MMOL/L — SIGNIFICANT CHANGE UP (ref 3.5–5.3)
PROT SERPL-MCNC: 5.8 G/DL — LOW (ref 6–8.3)
RBC # BLD: 3.7 M/UL — LOW (ref 4.2–5.8)
RBC # FLD: 15.2 % — HIGH (ref 10.3–14.5)
SODIUM SERPL-SCNC: 135 MMOL/L — SIGNIFICANT CHANGE UP (ref 135–145)
WBC # BLD: 18.04 K/UL — HIGH (ref 3.8–10.5)
WBC # FLD AUTO: 18.04 K/UL — HIGH (ref 3.8–10.5)

## 2018-06-29 PROCEDURE — 99223 1ST HOSP IP/OBS HIGH 75: CPT

## 2018-06-29 PROCEDURE — 74018 RADEX ABDOMEN 1 VIEW: CPT | Mod: 26

## 2018-06-29 RX ORDER — ONDANSETRON 8 MG/1
4 TABLET, FILM COATED ORAL EVERY 6 HOURS
Qty: 0 | Refills: 0 | Status: DISCONTINUED | OUTPATIENT
Start: 2018-06-29 | End: 2018-07-01

## 2018-06-29 RX ORDER — ONDANSETRON 8 MG/1
4 TABLET, FILM COATED ORAL ONCE
Qty: 0 | Refills: 0 | Status: DISCONTINUED | OUTPATIENT
Start: 2018-06-29 | End: 2018-06-29

## 2018-06-29 RX ORDER — MORPHINE SULFATE 50 MG/1
15 CAPSULE, EXTENDED RELEASE ORAL EVERY 8 HOURS
Qty: 0 | Refills: 0 | Status: DISCONTINUED | OUTPATIENT
Start: 2018-06-29 | End: 2018-06-29

## 2018-06-29 RX ORDER — MORPHINE SULFATE 50 MG/1
30 CAPSULE, EXTENDED RELEASE ORAL EVERY 8 HOURS
Qty: 0 | Refills: 0 | Status: DISCONTINUED | OUTPATIENT
Start: 2018-06-29 | End: 2018-06-30

## 2018-06-29 RX ORDER — SODIUM CHLORIDE 9 MG/ML
1000 INJECTION INTRAMUSCULAR; INTRAVENOUS; SUBCUTANEOUS
Qty: 0 | Refills: 0 | Status: DISCONTINUED | OUTPATIENT
Start: 2018-06-29 | End: 2018-07-01

## 2018-06-29 RX ADMIN — HYDROMORPHONE HYDROCHLORIDE 0.5 MILLIGRAM(S): 2 INJECTION INTRAMUSCULAR; INTRAVENOUS; SUBCUTANEOUS at 08:14

## 2018-06-29 RX ADMIN — MORPHINE SULFATE 30 MILLIGRAM(S): 50 CAPSULE, EXTENDED RELEASE ORAL at 22:18

## 2018-06-29 RX ADMIN — ONDANSETRON 4 MILLIGRAM(S): 8 TABLET, FILM COATED ORAL at 22:18

## 2018-06-29 RX ADMIN — SENNA PLUS 2 TABLET(S): 8.6 TABLET ORAL at 22:18

## 2018-06-29 RX ADMIN — HYDROMORPHONE HYDROCHLORIDE 0.5 MILLIGRAM(S): 2 INJECTION INTRAMUSCULAR; INTRAVENOUS; SUBCUTANEOUS at 15:00

## 2018-06-29 RX ADMIN — HYDROMORPHONE HYDROCHLORIDE 0.5 MILLIGRAM(S): 2 INJECTION INTRAMUSCULAR; INTRAVENOUS; SUBCUTANEOUS at 14:47

## 2018-06-29 RX ADMIN — MORPHINE SULFATE 30 MILLIGRAM(S): 50 CAPSULE, EXTENDED RELEASE ORAL at 22:48

## 2018-06-29 RX ADMIN — HYDROMORPHONE HYDROCHLORIDE 0.5 MILLIGRAM(S): 2 INJECTION INTRAMUSCULAR; INTRAVENOUS; SUBCUTANEOUS at 08:26

## 2018-06-29 RX ADMIN — APIXABAN 5 MILLIGRAM(S): 2.5 TABLET, FILM COATED ORAL at 18:49

## 2018-06-29 RX ADMIN — APIXABAN 5 MILLIGRAM(S): 2.5 TABLET, FILM COATED ORAL at 07:02

## 2018-06-29 RX ADMIN — TAMSULOSIN HYDROCHLORIDE 0.4 MILLIGRAM(S): 0.4 CAPSULE ORAL at 22:18

## 2018-06-29 NOTE — CONSULT NOTE ADULT - PROBLEM SELECTOR RECOMMENDATION 4
For the time being, patient is full code.   Having discussions with family about next steps.   Wish to be re-evaluated after weekend.

## 2018-06-29 NOTE — CONSULT NOTE ADULT - PROBLEM SELECTOR RECOMMENDATION 9
Patient with stage IV, metastatic disease to liver, lungs, peritoneum.  Oncology input appreciated.  Patient currently with ECOG 3. However, also with albumin 1.1, poor nutritional status.  Unclear whether further disease modifying interventions will be offered.

## 2018-06-29 NOTE — CONSULT NOTE ADULT - SUBJECTIVE AND OBJECTIVE BOX
HPI:  This is a 78M with history of recently diagnosed metastatic adenocarcinoma (suspected pancreatic, mets to liver/pulm/peritonuem, not on chemotherapy yet), CAD s/p CABG 4V, B-Cell lymphoma s/p chemo/RT, HTN, Hypothyroidism, BPH and DM2 who presents to the hospital with worsening PO intake and failure to thrive at home. Patient was recently hospitalized at [facility] from 6/6 - 6/13 and since his discharge, as per his wife, he has had very poor food/water intake and has deconditioned while at home. Said that the patient doesnt have any appetite at home and eats very small amounts of food whenever he eats, said that the patient just denies having any appetite at home. His wife said that as a result of his poor oral intake, she thinks he has lost about 10-15 lbs since coming home. Said that due to his poor food intake the patient's FS have also been low to the 60s and she has not given him any insulin for the past 1-2 days. She also said that he also has been having severe back pain that has been poorly controlled with his home pain medications and the patient is unable to get any significant amount of sleep while at home. Said that the patient was to start his chemotherapy on 6/26 but was unable to make it to his appointment due to his weakness and his oncologist therefore recommended that he come to the hospital and she therefore brought him to [facility]. Currently the patient is laying in bed, said that he has a 7/10 pain in his back with radiation to his front but his wife said that he looks comfortable after he had IV pain meds in the ED. He and his wife deny any other acute complaints.     In the ED, his vitals were T 97.4, P 115, BP 92/56, R 19, o2 sat 100% RA. His lab work was significant for leukocytosis, JUAN, and transaminitis. He was given 2L LR, fentanyl 50 mcg IVP x1, and dilaudid 1mg IVP x1. He was admitted to medicine. (27 Jun 2018 04:09)      PERTINENT PMH REVIEWED:  [x ] YES [ ] NO           SOCIAL HISTORY:  Significant other/partner:  [ x] YES  [ ] NO            Children:  [x ] YES  [ ] NO                   Episcopal/Spirituality: Yazidism  Subtance hx:  [ ] YES   [x ] NO           Tobacco hx:  [ ] YES  [ x] NO             Alcohol hx: [ ] YES  [ x] NO        Home Opiod hx:  [ ] YES  [x ] NO   Living Situation: [ x] Home  [ ] Long term care  [ ] Rehab    REFERRALS:   [ x] Chaplaincy  [ ] Hospice  [ ] Child Life  [ ] Social Work  [ ] Case management [ ] Holistic Therapy     FAMILY HISTORY:  No pertinent family history in first degree relatives    [x ] Family history non contributory     BASELINE ADLs (prior to admission):  Independent [ ] moderately [ ] fully   Dependent   [x ] moderately [ ] fully    ADVANCE DIRECTIVES:  [ ] YES [x ] NO   DNR [ ] YES [x ] NO                      MOLST  [ ] YES [ x] NO    Living Will  [ ] YES [ x] NO    Health Care Proxy [ ] YES  [ x] NO      [x ] Surrogate  [ ] HCP  [ ] Guardian:   Barb Leonard (daughter)                                                              Phone#: [phone]    Allergies    IV Contrast (Short breath; Flushing)    Intolerances        MEDICATIONS  (STANDING):  apixaban 5 milliGRAM(s) Oral every 12 hours  dextrose 5%. 1000 milliLiter(s) (50 mL/Hr) IV Continuous <Continuous>  dextrose 50% Injectable 12.5 Gram(s) IV Push once  dextrose 50% Injectable 25 Gram(s) IV Push once  dextrose 50% Injectable 25 Gram(s) IV Push once  docusate sodium 100 milliGRAM(s) Oral three times a day  insulin lispro (HumaLOG) corrective regimen sliding scale   SubCutaneous three times a day before meals  insulin lispro (HumaLOG) corrective regimen sliding scale   SubCutaneous at bedtime  levothyroxine 50 MICROGram(s) Oral daily  morphine ER Tablet 15 milliGRAM(s) Oral every 8 hours  senna 2 Tablet(s) Oral at bedtime  sodium chloride 0.9%. 1000 milliLiter(s) (75 mL/Hr) IV Continuous <Continuous>  tamsulosin 0.4 milliGRAM(s) Oral at bedtime    MEDICATIONS  (PRN):  bisacodyl 5 milliGRAM(s) Oral every 12 hours PRN Constipation  dextrose 40% Gel 15 Gram(s) Oral once PRN Blood Glucose LESS THAN 70 milliGRAM(s)/deciliter  glucagon  Injectable 1 milliGRAM(s) IntraMuscular once PRN Glucose LESS THAN 70 milligrams/deciliter  HYDROmorphone  Injectable 0.5 milliGRAM(s) IV Push every 4 hours PRN Moderate Pain (4 - 6)  HYDROmorphone  Injectable 1 milliGRAM(s) IV Push every 4 hours PRN Severe Pain (7 - 10)      PRESENT SYMPTOMS:  Source: [ x] Patient   [ ] Family   [ ] Team     Pain: [x ] YES [ ] NO  OLDCARTS:   6/10, back radiating to abdomen. Dull, achy. Minimally relieved with opiates.    Dyspnea: [ ] YES [ x] NO   Anxiety: [ ] YES [x ] NO  Fatigue: [x ] YES [ ] NO   Nausea: [ ] YES [x ] NO  Loss of appetite: [ x] YES [ ] NO   Constipation: [ x] YES [ ] NO     Other Symptoms:  [x ] All other review of systems negative   [ ] Unable to obtain due to poor mentation     Karnofsky Performance Score/Palliative Performance Status Version 2:  40-50       %  Protein Calorie Malutrition:  [ ] Mild   [ ] Moderate   [x Severe     Vital Signs Last 24 Hrs  T(C): 37.1 (29 Jun 2018 05:15), Max: 37.1 (29 Jun 2018 05:15)  T(F): 98.8 (29 Jun 2018 05:15), Max: 98.8 (29 Jun 2018 05:15)  HR: 108 (29 Jun 2018 08:16) (102 - 108)  BP: 120/67 (29 Jun 2018 08:16) (119/68 - 128/71)  BP(mean): --  RR: 18 (29 Jun 2018 05:15) (18 - 18)  SpO2: 99% (29 Jun 2018 05:15) (98% - 99%)    Physical Exam:    General: x[ ] Alert,  A&O x 3    [ ] lethargic   [ ] Agitated   [ ] Cachexia   HEENT: [ ] Normal   [x ] Dry mouth   [ ] ET Tube    [ ] Trach   Lungs: [ x] Clear [ ] Rhonchi  [ ] Crackles [ ] Wheezing [ ] Tachypnea  [ ] Audible excessive secretions   Cardiovascular:  [ x] Regular rate and rhythm  [ ] Irregular [ ] Tachycardia   [ ] Bradycardia   Abdomen: [ ] Soft  [x ] Distended  [ ]  [ x] +BS  [ ] Non tender [x ] Tender  [ ]PEG   [ ] NGT   Last BM:     Genitourinary: [x ] Normal [ ] Incontinent   [ ] Oliguria/Anuria   [ ] San  Musculoskeletal:  [ ] Normal   [x ] Generalized weakness  [ ] Bedbound   Neurological: [ x] No focal deficits  [ ] Cognitive impairment     Skin: [ x] Normal   [ ] Pressure ulcers     LABS:                        9.8    18.04 )-----------( 209      ( 29 Jun 2018 11:00 )             31.9     06-29    135  |  110<H>  |  40<H>  ----------------------------<  116<H>  5.3   |  14<L>  |  0.85    Ca    9.0      29 Jun 2018 11:00  Phos  2.8     06-29  Mg     2.3     06-29    TPro  5.8<L>  /  Alb  1.1<L>  /  TBili  1.2  /  DBili  x   /  AST  227<H>  /  ALT  166<H>  /  AlkPhos  292<H>  06-29        I&O's Summary    28 Jun 2018 07:01  -  29 Jun 2018 07:00  --------------------------------------------------------  IN: 0 mL / OUT: 850 mL / NET: -850 mL        RADIOLOGY & ADDITIONAL STUDIES:
Patient is a 78y old  Male who presents with a chief complaint of SOB>1 month, FTF (27 Jun 2018 08:28), very weak, not able to ambulate (was walking without any help 4 weeks ago), was here and diagnosed with metastatic pancreatic cancer to the liver, lungs, was supposed to start chemo yesterday but because of deterioration clinically, it was held. Pt was quite active 4 weeks ago. Denies any pain, fevers, chills, or any other active symptoms except above. Family is looking for any Rx options.      PAST MEDICAL & SURGICAL HISTORY:  B-cell lymphoma, unspecified B-cell lymphoma type, unspecified body region: s/p chemo/RT  Benign prostatic hyperplasia without lower urinary tract symptoms  Hypothyroidism, unspecified type  Pancreatic cancer  Liver cancer  CAD (coronary artery disease) s/p 4V-CABG (2010)  Diabetes mellitus type II  Hyperlipidemia  Hypertension  S/P CABG x 4: 2010        Meds:  apixaban 5 milliGRAM(s) Oral every 12 hours  bisacodyl 5 milliGRAM(s) Oral every 12 hours PRN  dextrose 40% Gel 15 Gram(s) Oral once PRN  dextrose 5%. 1000 milliLiter(s) IV Continuous <Continuous>  dextrose 50% Injectable 12.5 Gram(s) IV Push once  dextrose 50% Injectable 25 Gram(s) IV Push once  dextrose 50% Injectable 25 Gram(s) IV Push once  docusate sodium 100 milliGRAM(s) Oral three times a day  glucagon  Injectable 1 milliGRAM(s) IntraMuscular once PRN  HYDROmorphone  Injectable 0.5 milliGRAM(s) IV Push every 4 hours PRN  HYDROmorphone  Injectable 1 milliGRAM(s) IV Push every 4 hours PRN  insulin lispro (HumaLOG) corrective regimen sliding scale   SubCutaneous three times a day before meals  insulin lispro (HumaLOG) corrective regimen sliding scale   SubCutaneous at bedtime  lactated ringers. 1000 milliLiter(s) IV Continuous <Continuous>  levothyroxine 50 MICROGram(s) Oral daily  morphine ER Tablet 15 milliGRAM(s) Oral two times a day  senna 2 Tablet(s) Oral at bedtime  tamsulosin 0.4 milliGRAM(s) Oral at bedtime    Allergies:  IV Contrast (Short breath; Flushing)      SHx: never smoked and never used alcohol      FAMILY HISTORY:  No pertinent family history in first degree relatives      Vital Signs Last 24 Hrs  T(C): 36.8 (27 Jun 2018 08:26), Max: 37.2 (27 Jun 2018 00:27)  T(F): 98.2 (27 Jun 2018 08:26), Max: 98.9 (27 Jun 2018 00:27)  HR: 72 (27 Jun 2018 08:26) (72 - 116)  BP: 113/67 (27 Jun 2018 08:26) (92/56 - 131/79)  BP(mean): --  RR: 17 (27 Jun 2018 08:26) (17 - 19)  SpO2: 98% (27 Jun 2018 08:26) (98% - 100%)                          10.7   18.63 )-----------( 256      ( 27 Jun 2018 05:40 )             33.0       06-27    140  |  102  |  45<H>  ----------------------------<  77  5.0   |  24  |  1.09    Ca    11.4<H>      27 Jun 2018 05:40  Phos  3.1     06-27  Mg     2.3     06-27    TPro  6.7  /  Alb  2.3<L>  /  TBili  0.9  /  DBili  0.4<H>  /  AST  151<H>  /  ALT  116<H>  /  AlkPhos  330<H>  06-27        Path: Cytopathology - Non Gyn Report:   ACCESSION No:  [id number]    SHYLA MCGOVERN                        2        Cytopathology Report            Final Diagnosis  LIVER, CT GUIDED CORE BIOPSY  POSITIVE FOR MALIGNANT CELLS.  Poorly differentiated adenocarcinoma, see note.    Note: Thecytology touch preparations and core biopsy show solid  nests, clusters, and single isolated malignant epithelial cells  with high nuclear to cytoplasmic ratio, irregular nuclear  contour, prominent nucleoli, and vacuolated cytoplasm  infiltrating liver parenchyma. Intracytoplasmic mucin is also  identified. Immunohistochemical studies show the malignant cells  are strongly and diffusely positive for CK7 and negative for  CK20, TTF-1, arginase, and CD45. The overall findings are  compatible with a poorly differentiated adenocarcinoma. Possible  primary sites include upper GI, pancreaticobiliary, among others.  Clinical and radiologic correlation is recommended.    The background liver shows features compatible with adjacent mass  effect. Mildmacrovesicular steatosis is also identified and  raises the possibility of an underlying fatty liver disease.  Distinction between toxic (i.e. due to alcohol or medication),  metabolic (i.e. due to obesity), or other causes of fatty liver  injury cannot be made on histologic examination alone.  Correlation with the patient's clinical and serologic findings is  recommended for further evaluation.    This case was reviewed for  at the GI and  Hepatobiliary intradepartmental consensus conference with  concurrence of the diagnosis on 06/12/18.    Report faxed to Dr. Lennon's office on 06/12/18.    Slide(s) with built in immunohistochemical study control(s) and  negative control associated with this case has/have been verified  by the sign out pathologist.  For slide(s) without built in controls positive control slides  has/have been reviewed and approved by immunohistochemistry lab  These immunohistochemical/ in-situ hybridization tests have been  developed and their performance characteristics determined by  [facility], Department of Pathology,  Division of Immunopathology, [address] [place] [address].  It has not been cleared or approved by the U.[place]. Food  and Drug Administration.  The FDA has determined that such  clearance or approval is not necessary.  This test is used for  clinical purposes.  The laboratory is certified under the CLIA-88  as qualified to perform high complexity clinical            SHYLA MCGOVERN          2        Cytopathology Report          testing.    Screened by: Yung VÁZQUEZ([facility])  Verified by: Aishwarya Hawk MD  (Electronic Signature)  Reported on: 06/12/18 15:03 EDT, [address] [place] [address]  [id number]  Cytology technical processing performed at [address] [place] [id number]  _________________________________________________________________      Specimen(s) Submitted  LIVER, CT GUIDED CORE BIOPSY      Statement of Adequacy  Immediate cytologic study for adequacyof specimen using a Diff-  Quik stain was performed at [facility], [address] [place] [address]. FNA  acceptable for further evaluation by KATHIE Washington([facility]).      Clinical Information  History of lymphoma. Pancreatic and liver mass.      Gross Description  Core Biopsy  Received in formalin multiple pieces of  white/pink tissue 1 cm  in length. Entire specimen submitted in one cassette. Specimen  label has been inspected to confirm patient's name and date of  birth.  4 Touch Prep made from core biopsy.  Submitted: core biopsy in one cassette labeled 1B    FNA  No material submitted for cell block (1A)    Prepared:4 Touch Prep  1 core biopsy (2 slides) labeled 1B  6 slides total (06.08.18 @ 12:25)    CT c/a/p: MPRESSION: Numerous subcentimeter pulmonary nodules as described above   worrisome for metastatic disease.    Small right and trace left pleural effusions with bibasilar areas of   atelectasis, right greater than left demonstrating interval progression   since June 6, 2018.    For complete evaluation of the upper abdominal lesions, please refer to   dedicated abdominal CT of June 6, 2018.    MPRESSION:  Metastatic disease involving the liver and the lung bases. Peritoneal   carcinomatosis. Heterogeneous mass involving the gastric fundus,   pancreatic body/tail, left adrenal gland, and the splenic hilum.                            DON OSWALD M.D. ATTENDING RADIOLOGIST

## 2018-06-29 NOTE — CHART NOTE - NSCHARTNOTEFT_GEN_A_CORE
Spoke with son Tej over the phone at [phone] regarding advanced directives. Son will like to discuss with family regarding DNR/DNI status. Form faxed over to [phone]. As per son, he is the HCP for pt but no HCP documentation in chart. Son will fax over HCP form. Will follow.     ADS  [id number]

## 2018-06-29 NOTE — CONSULT NOTE ADULT - PROBLEM SELECTOR RECOMMENDATION 2
Increase MS Contin to 15mg TID.  Continue with Dilaudid 0.5mg IV Q4 hours prn.  May need to increase long acting dose over the next few days.

## 2018-06-29 NOTE — CONSULT NOTE ADULT - ASSESSMENT
78M with above clinical features, has high calcium, likely from metastatic pancreatic cancer, has a poor PS (ECOG 3), FTF, pain is controlled. Discussed the results, relevance, the diagnosis of stage 4 pancreatic cancer, prognosis and treatment options in great detail with him, his wife and then his son and all of their dozens of questions answered. Pt is too sick and weak to benefit from chemo at this time and he expressed that he does not want any chemo (but may be he is delirious from metabolic derangements), will recommend:  - aggressive hydration - 125 -150 cc/h  - zometa 4 mg in 100 cc over 20 min.  - continue current pain control measures  - continue therapeutic a/c  - monitor CBC, ca, creatinine and LFTs  - the role TPN is minimal and has not been shown to improve survival  - pt has to improve markedly before any chemo can be considered.  - genomic analysis on the tissue was sent by the primary oncologist as per pt's son for possibility of immunotherapy (if MSI-High, very small % of pancreatic cancer), it will take several days before that analysis will be available.   - if no improvement and pt continues to refuse chemo, palliative care/comfort care and hospice  - time spent on discussions > 1 hour  - discussed with DR Barraza
78 M with stage IV pancreatic cancer, neoplasm related pain, constipation, encounter for palliative care.

## 2018-06-30 LAB
ALBUMIN SERPL ELPH-MCNC: 1.8 G/DL — LOW (ref 3.3–5)
ALP SERPL-CCNC: 299 U/L — HIGH (ref 40–120)
ALT FLD-CCNC: 195 U/L — HIGH (ref 4–41)
AST SERPL-CCNC: 303 U/L — HIGH (ref 4–40)
BILIRUB SERPL-MCNC: 1.1 MG/DL — SIGNIFICANT CHANGE UP (ref 0.2–1.2)
BUN SERPL-MCNC: 43 MG/DL — HIGH (ref 7–23)
CALCIUM SERPL-MCNC: 9 MG/DL — SIGNIFICANT CHANGE UP (ref 8.4–10.5)
CHLORIDE SERPL-SCNC: 112 MMOL/L — HIGH (ref 98–107)
CO2 SERPL-SCNC: 21 MMOL/L — LOW (ref 22–31)
CREAT SERPL-MCNC: 0.99 MG/DL — SIGNIFICANT CHANGE UP (ref 0.5–1.3)
GLUCOSE SERPL-MCNC: 105 MG/DL — HIGH (ref 70–99)
HCT VFR BLD CALC: 29.5 % — LOW (ref 39–50)
HGB BLD-MCNC: 9.5 G/DL — LOW (ref 13–17)
MAGNESIUM SERPL-MCNC: 2.3 MG/DL — SIGNIFICANT CHANGE UP (ref 1.6–2.6)
MCHC RBC-ENTMCNC: 26.5 PG — LOW (ref 27–34)
MCHC RBC-ENTMCNC: 32.2 % — SIGNIFICANT CHANGE UP (ref 32–36)
MCV RBC AUTO: 82.2 FL — SIGNIFICANT CHANGE UP (ref 80–100)
NRBC # FLD: 0.04 — SIGNIFICANT CHANGE UP
PHOSPHATE SERPL-MCNC: 2.6 MG/DL — SIGNIFICANT CHANGE UP (ref 2.5–4.5)
PLATELET # BLD AUTO: 189 K/UL — SIGNIFICANT CHANGE UP (ref 150–400)
PMV BLD: 10.2 FL — SIGNIFICANT CHANGE UP (ref 7–13)
POTASSIUM SERPL-MCNC: 4.6 MMOL/L — SIGNIFICANT CHANGE UP (ref 3.5–5.3)
POTASSIUM SERPL-SCNC: 4.6 MMOL/L — SIGNIFICANT CHANGE UP (ref 3.5–5.3)
PROT SERPL-MCNC: 5.8 G/DL — LOW (ref 6–8.3)
RBC # BLD: 3.59 M/UL — LOW (ref 4.2–5.8)
RBC # FLD: 15.6 % — HIGH (ref 10.3–14.5)
SODIUM SERPL-SCNC: 144 MMOL/L — SIGNIFICANT CHANGE UP (ref 135–145)
WBC # BLD: 14.84 K/UL — HIGH (ref 3.8–10.5)
WBC # FLD AUTO: 14.84 K/UL — HIGH (ref 3.8–10.5)

## 2018-06-30 RX ORDER — MORPHINE SULFATE 50 MG/1
15 CAPSULE, EXTENDED RELEASE ORAL EVERY 8 HOURS
Qty: 0 | Refills: 0 | Status: DISCONTINUED | OUTPATIENT
Start: 2018-06-30 | End: 2018-07-01

## 2018-06-30 RX ADMIN — MORPHINE SULFATE 15 MILLIGRAM(S): 50 CAPSULE, EXTENDED RELEASE ORAL at 23:54

## 2018-06-30 RX ADMIN — MORPHINE SULFATE 30 MILLIGRAM(S): 50 CAPSULE, EXTENDED RELEASE ORAL at 06:23

## 2018-06-30 RX ADMIN — SODIUM CHLORIDE 75 MILLILITER(S): 9 INJECTION INTRAMUSCULAR; INTRAVENOUS; SUBCUTANEOUS at 22:27

## 2018-06-30 RX ADMIN — MORPHINE SULFATE 30 MILLIGRAM(S): 50 CAPSULE, EXTENDED RELEASE ORAL at 06:53

## 2018-06-30 RX ADMIN — SODIUM CHLORIDE 75 MILLILITER(S): 9 INJECTION INTRAMUSCULAR; INTRAVENOUS; SUBCUTANEOUS at 06:50

## 2018-06-30 RX ADMIN — APIXABAN 5 MILLIGRAM(S): 2.5 TABLET, FILM COATED ORAL at 06:20

## 2018-06-30 RX ADMIN — TAMSULOSIN HYDROCHLORIDE 0.4 MILLIGRAM(S): 0.4 CAPSULE ORAL at 22:28

## 2018-06-30 RX ADMIN — Medication 50 MICROGRAM(S): at 06:20

## 2018-06-30 RX ADMIN — APIXABAN 5 MILLIGRAM(S): 2.5 TABLET, FILM COATED ORAL at 18:14

## 2018-06-30 RX ADMIN — Medication 10 MILLIGRAM(S): at 18:14

## 2018-06-30 RX ADMIN — Medication 100 MILLIGRAM(S): at 22:28

## 2018-06-30 RX ADMIN — SENNA PLUS 2 TABLET(S): 8.6 TABLET ORAL at 22:28

## 2018-06-30 NOTE — PROGRESS NOTE ADULT - PROBLEM SELECTOR PLAN 1
- Suspect patient's JUAN is in setting of his poor PO intake for the past ~2 weeks, received 2L LR in ED, will c/w LR at 75 cc/hr and monitor his BUN/Cr, Which has improved with IV fluids.
Improved creat with supportive care/ IVF
- Suspect patient's JUAN is in setting of his poor PO intake for the past ~2 weeks, received 2L LR in ED, will c/w LR at 75 cc/hr and monitor his BUN/Cr in AM  - Will hold ramipril for now given his JAUN

## 2018-06-30 NOTE — PROGRESS NOTE ADULT - ASSESSMENT
78M with above clinical features, has high calcium, likely from metastatic pancreatic cancer, has a poor PS (ECOG 3), FTF, pain abd is controlled.  s/p zometa yesterday for hypercalcemia, Will recommend:  - discussed the issues in detail with the son on the phone and discussed the issues including advanced directives and DNR, DNI issues, he is agreeable to signing DNR/DNI with continuation of other active Rx and will be here in the afternoon to sign the papers.  - decrease fluids to 75 cc ns/h for now and reevaluate daily  - continue current pain control measures  - continue therapeutic a/c  - monitor CBC, ca, creatinine and LFTs  - overall prognosis appears to be poor, will discuss with the pt and son again tomorrow regarding the Rx plan and advanced directives  - discussed with RN
This is a 78M with history as above who presents from home with poor PO intake and FTT. patient found to have JUAN likely 2/2 poor PO intake.
This is a 78M with history as above who presents from home with poor PO intake and FTT. patient found to have JUAN likely 2/2 poor PO intake.
78M with above clinical features, has high calcium, likely from metastatic pancreatic cancer, has a poor PS (ECOG 3), FTF, pain abd is controlled.  s/p zometa yesterday for hypercalcemia, Will recommend:  - aggressive hydration - 125 -150 cc/h, reevaluate daily and change as needed  - continue current pain control measures  - continue therapeutic a/c  - monitor CBC, ca, creatinine and LFTs  - overall prognosis appears to be poor, will discuss with the pt and son again tomorrow regarding the Rx plan and advanced directives  - discussed with NP
This is a 78M with history as above who presents from home with poor PO intake and FTT. patient found to have JUAN likely 2/2 poor PO intake.

## 2018-06-30 NOTE — PROGRESS NOTE ADULT - PROBLEM SELECTOR PROBLEM 7
Type 2 diabetes mellitus with hypoglycemia without coma, with long-term current use of insulin

## 2018-06-30 NOTE — PROGRESS NOTE ADULT - PROBLEM SELECTOR PLAN 2
- Likely in setting of his new metastatic adenocarcinoma
- Likely in setting of his new metastatic adenocarcinoma  - Will hydrate as above, will encourage PO intake and monitor
- Likely in setting of his new metastatic adenocarcinoma  - Will hydrate as above, will encourage PO intake and monitor

## 2018-06-30 NOTE — PROGRESS NOTE ADULT - PROBLEM SELECTOR PLAN 3
- Likely 2/2 poor PO intake, fluids as above and will recheck lactate in AM

## 2018-06-30 NOTE — PROGRESS NOTE ADULT - PROBLEM SELECTOR PROBLEM 9
Benign prostatic hyperplasia without lower urinary tract symptoms

## 2018-06-30 NOTE — PROGRESS NOTE ADULT - PROBLEM SELECTOR PLAN 6
- Pt was to start chemotherapy as outpatient on 6/26 but could not make it to the appointment 2/2 weakness  - Consult in oncologist in AM (Dr. Parker) and f/u recs for further management of his adenocarcinoma
- Pt was to start chemotherapy as outpatient on 6/26 but could not make it to the appointment 2/2 weakness  - Onc is following
- Pt was to start chemotherapy as outpatient on 6/26 but could not make it to the appointment 2/2 weakness  - Consult in oncologist in AM (Dr. Parker) and f/u recs for further management of his adenocarcinoma

## 2018-06-30 NOTE — PROVIDER CONTACT NOTE (OTHER) - BACKGROUND
78 M PMHx recently diagnosed metastatic adenocarcinoma (suspected pancreatic, mets to liver/lungs/peritonuem, not on chemotherapy yet), CAD S/P CABG 4V.

## 2018-06-30 NOTE — PROGRESS NOTE ADULT - NSHPATTENDINGPLANDISCUSS_GEN_ALL_CORE
PAtient's son. He has expressed a desire to watch him until his mental status improves prior to calling in hospice

## 2018-06-30 NOTE — PROGRESS NOTE ADULT - SUBJECTIVE AND OBJECTIVE BOX
Patient is a 78y old  Male who presents with a chief complaint of SOB>1 month, FTF (27 Jun 2018 08:28)      SUBJECTIVE / OVERNIGHT EVENTS:  Lethargic, no SOB or pain reported  Review of Systems:   CONSTITUTIONAL: No fever, weight loss, or fatigue  EYES: No eye pain, visual disturbances, or discharge  ENMT:  No difficulty hearing, tinnitus, vertigo; No sinus or throat pain  NECK: No pain or stiffness  BREASTS: No pain, masses, or nipple discharge  RESPIRATORY: No cough, wheezing, chills or hemoptysis; No shortness of breath  CARDIOVASCULAR: No chest pain, palpitations, dizziness, or leg swelling  GASTROINTESTINAL: No abdominal or epigastric pain. No nausea, vomiting, or hematemesis; No diarrhea or constipation. No melena or hematochezia.  GENITOURINARY: No dysuria, frequency, hematuria, or incontinence  NEUROLOGICAL: No headaches, memory loss, loss of strength, numbness, or tremors  SKIN: No itching, burning, rashes, or lesions   LYMPH NODES: No enlarged glands  ENDOCRINE: No heat or cold intolerance; No hair loss  MUSCULOSKELETAL: No joint pain or swelling; No muscle, back, or extremity pain  PSYCHIATRIC: No depression, anxiety, mood swings, or difficulty sleeping  HEME/LYMPH: No easy bruising, or bleeding gums  ALLERY AND IMMUNOLOGIC: No hives or eczema    MEDICATIONS  (STANDING):  apixaban 5 milliGRAM(s) Oral every 12 hours  dextrose 5%. 1000 milliLiter(s) (50 mL/Hr) IV Continuous <Continuous>  dextrose 50% Injectable 12.5 Gram(s) IV Push once  dextrose 50% Injectable 25 Gram(s) IV Push once  dextrose 50% Injectable 25 Gram(s) IV Push once  docusate sodium 100 milliGRAM(s) Oral three times a day  insulin lispro (HumaLOG) corrective regimen sliding scale   SubCutaneous three times a day before meals  insulin lispro (HumaLOG) corrective regimen sliding scale   SubCutaneous at bedtime  levothyroxine 50 MICROGram(s) Oral daily  morphine ER Tablet 15 milliGRAM(s) Oral every 8 hours  senna 2 Tablet(s) Oral at bedtime  sodium chloride 0.9%. 1000 milliLiter(s) (75 mL/Hr) IV Continuous <Continuous>  tamsulosin 0.4 milliGRAM(s) Oral at bedtime    MEDICATIONS  (PRN):  bisacodyl 5 milliGRAM(s) Oral every 12 hours PRN Constipation  bisacodyl Suppository 10 milliGRAM(s) Rectal once PRN Constipation  dextrose 40% Gel 15 Gram(s) Oral once PRN Blood Glucose LESS THAN 70 milliGRAM(s)/deciliter  glucagon  Injectable 1 milliGRAM(s) IntraMuscular once PRN Glucose LESS THAN 70 milligrams/deciliter  HYDROmorphone  Injectable 0.5 milliGRAM(s) IV Push every 4 hours PRN Moderate Pain (4 - 6)  HYDROmorphone  Injectable 1 milliGRAM(s) IV Push every 4 hours PRN Severe Pain (7 - 10)  ondansetron Injectable 4 milliGRAM(s) IV Push every 6 hours PRN Nausea and/or Vomiting      PHYSICAL EXAM:  Vital Signs Last 24 Hrs  T(C): 36.4 (30 Jun 2018 13:11), Max: 36.4 (29 Jun 2018 21:28)  T(F): 97.6 (30 Jun 2018 13:11), Max: 97.6 (29 Jun 2018 21:28)  HR: 108 (30 Jun 2018 13:11) (104 - 108)  BP: 94/58 (30 Jun 2018 13:11) (92/59 - 123/75)  BP(mean): --  RR: 16 (30 Jun 2018 13:11) (16 - 18)  SpO2: 96% (30 Jun 2018 13:11) (96% - 99%)  I&O's Summary    GENERAL: NAD, well-developed  HEAD:  Atraumatic, Normocephalic  EYES: EOMI, PERRLA, conjunctiva and sclera clear  NECK: Supple, No JVD  CHEST/LUNG: Clear to auscultation bilaterally; No wheeze  HEART: Regular rate and rhythm; No murmurs, rubs, or gallops  ABDOMEN: Soft, Nontender, Nondistended; Bowel sounds present  EXTREMITIES:  2+ Peripheral Pulses, No clubbing, cyanosis, or edema  PSYCH: AAOx3  NEUROLOGY: non-focal  SKIN: No rashes or lesions    LABS:  CAPILLARY BLOOD GLUCOSE      POCT Blood Glucose.: 105 mg/dL (30 Jun 2018 12:32)  POCT Blood Glucose.: 105 mg/dL (30 Jun 2018 08:37)  POCT Blood Glucose.: 107 mg/dL (29 Jun 2018 22:26)  POCT Blood Glucose.: 104 mg/dL (29 Jun 2018 17:33)                          9.5    14.84 )-----------( 189      ( 30 Jun 2018 05:47 )             29.5     06-30    144  |  112<H>  |  43<H>  ----------------------------<  105<H>  4.6   |  21<L>  |  0.99    Ca    9.0      30 Jun 2018 05:47  Phos  2.6     06-30  Mg     2.3     06-30    TPro  5.8<L>  /  Alb  1.8<L>  /  TBili  1.1  /  DBili  x   /  AST  303<H>  /  ALT  195<H>  /  AlkPhos  299<H>  06-30              RADIOLOGY & ADDITIONAL TESTS:    Imaging Personally Reviewed:    Consultant(s) Notes Reviewed:      Care Discussed with Consultants/Other Providers:
Patient is a 78y old  Male who presents with a chief complaint of SOB>1 month, FTF (27 Jun 2018 08:28)      SUBJECTIVE / OVERNIGHT EVENTS:  Lethargic, opens eyes on verbal cues. Feels weak and fatigued.  Review of Systems:   CONSTITUTIONAL: No fever, weight loss, or fatigue  EYES: No eye pain, visual disturbances, or discharge  ENMT:  No difficulty hearing, tinnitus, vertigo; No sinus or throat pain  NECK: No pain or stiffness  BREASTS: No pain, masses, or nipple discharge  RESPIRATORY: No cough, wheezing, chills or hemoptysis; No shortness of breath  CARDIOVASCULAR: No chest pain, palpitations, dizziness, or leg swelling  GASTROINTESTINAL: No abdominal or epigastric pain. No nausea, vomiting, or hematemesis; No diarrhea or constipation. No melena or hematochezia.  GENITOURINARY: No dysuria, frequency, hematuria, or incontinence  NEUROLOGICAL: No headaches, memory loss, loss of strength, numbness, or tremors  SKIN: No itching, burning, rashes, or lesions   LYMPH NODES: No enlarged glands  ENDOCRINE: No heat or cold intolerance; No hair loss  MUSCULOSKELETAL: No joint pain or swelling; No muscle, back, or extremity pain  PSYCHIATRIC: No depression, anxiety, mood swings, or difficulty sleeping  HEME/LYMPH: No easy bruising, or bleeding gums  ALLERY AND IMMUNOLOGIC: No hives or eczema    MEDICATIONS  (STANDING):  apixaban 5 milliGRAM(s) Oral every 12 hours  dextrose 5%. 1000 milliLiter(s) (50 mL/Hr) IV Continuous <Continuous>  dextrose 50% Injectable 12.5 Gram(s) IV Push once  dextrose 50% Injectable 25 Gram(s) IV Push once  dextrose 50% Injectable 25 Gram(s) IV Push once  docusate sodium 100 milliGRAM(s) Oral three times a day  insulin lispro (HumaLOG) corrective regimen sliding scale   SubCutaneous three times a day before meals  insulin lispro (HumaLOG) corrective regimen sliding scale   SubCutaneous at bedtime  levothyroxine 50 MICROGram(s) Oral daily  morphine ER Tablet 15 milliGRAM(s) Oral two times a day  senna 2 Tablet(s) Oral at bedtime  sodium chloride 0.9%. 1000 milliLiter(s) (125 mL/Hr) IV Continuous <Continuous>  tamsulosin 0.4 milliGRAM(s) Oral at bedtime    MEDICATIONS  (PRN):  bisacodyl 5 milliGRAM(s) Oral every 12 hours PRN Constipation  dextrose 40% Gel 15 Gram(s) Oral once PRN Blood Glucose LESS THAN 70 milliGRAM(s)/deciliter  glucagon  Injectable 1 milliGRAM(s) IntraMuscular once PRN Glucose LESS THAN 70 milligrams/deciliter  HYDROmorphone  Injectable 0.5 milliGRAM(s) IV Push every 4 hours PRN Moderate Pain (4 - 6)  HYDROmorphone  Injectable 1 milliGRAM(s) IV Push every 4 hours PRN Severe Pain (7 - 10)      PHYSICAL EXAM:  Vital Signs Last 24 Hrs  T(C): 36.9 (28 Jun 2018 05:37), Max: 37.2 (27 Jun 2018 18:35)  T(F): 98.4 (28 Jun 2018 05:37), Max: 99 (27 Jun 2018 18:35)  HR: 104 (28 Jun 2018 05:37) (98 - 108)  BP: 118/68 (28 Jun 2018 05:37) (102/65 - 124/68)  BP(mean): --  RR: 18 (28 Jun 2018 05:37) (18 - 18)  SpO2: 100% (28 Jun 2018 05:37) (96% - 100%)  I&O's Summary    GENERAL: NAD, well-developed  HEAD:  Atraumatic, Normocephalic  EYES: EOMI, PERRLA, conjunctiva and sclera clear  NECK: Supple, No JVD  CHEST/LUNG: Clear to auscultation bilaterally; No wheeze  HEART: Regular rate and rhythm; No murmurs, rubs, or gallops  ABDOMEN: Soft, Nontender, distended; Bowel sounds present  EXTREMITIES:  2+ Peripheral Pulses, No clubbing, cyanosis, or edema  PSYCH: AAOx3  NEUROLOGY: non-focal  SKIN: No rashes or lesions    LABS:  CAPILLARY BLOOD GLUCOSE      POCT Blood Glucose.: 193 mg/dL (28 Jun 2018 12:30)  POCT Blood Glucose.: 121 mg/dL (28 Jun 2018 08:37)  POCT Blood Glucose.: 87 mg/dL (27 Jun 2018 22:23)  POCT Blood Glucose.: 103 mg/dL (27 Jun 2018 17:24)                          11.7   16.92 )-----------( 245      ( 28 Jun 2018 06:16 )             38.4     06-28    139  |  102  |  46<H>  ----------------------------<  83  5.0   |  23  |  0.97    Ca    11.3<H>      28 Jun 2018 06:16  Phos  3.1     06-27  Mg     2.3     06-27    TPro  6.4  /  Alb  2.2<L>  /  TBili  1.2  /  DBili  0.5<H>  /  AST  212<H>  /  ALT  144<H>  /  AlkPhos  374<H>  06-28              RADIOLOGY & ADDITIONAL TESTS:    Imaging Personally Reviewed:    Consultant(s) Notes Reviewed:      Care Discussed with Consultants/Other Providers:
Patient is a 78y old  Male who presents with a chief complaint of SOB>1 month, FTF (27 Jun 2018 08:28)      SUBJECTIVE / OVERNIGHT EVENTS:  No new symptoms.  Review of Systems:   CONSTITUTIONAL: No fever, weight loss,Appears lethargic and fatigued.  EYES: No eye pain, visual disturbances, or discharge  ENMT:  No difficulty hearing, tinnitus, vertigo; No sinus or throat pain  NECK: No pain or stiffness  BREASTS: No pain, masses, or nipple discharge  RESPIRATORY: No cough, wheezing, chills or hemoptysis; No shortness of breath  CARDIOVASCULAR: No chest pain, palpitations, dizziness, or leg swelling  GASTROINTESTINAL: No abdominal or epigastric pain. No nausea, vomiting, or hematemesis; No diarrhea or constipation. No melena or hematochezia.  GENITOURINARY: No dysuria, frequency, hematuria, or incontinence  NEUROLOGICAL: No headaches, memory loss, loss of strength, numbness, or tremors  SKIN: No itching, burning, rashes, or lesions   LYMPH NODES: No enlarged glands  ENDOCRINE: No heat or cold intolerance; No hair loss  MUSCULOSKELETAL: No joint pain or swelling; No muscle, back, or extremity pain  PSYCHIATRIC: No depression, anxiety, mood swings, or difficulty sleeping  HEME/LYMPH: No easy bruising, or bleeding gums  ALLERY AND IMMUNOLOGIC: No hives or eczema    MEDICATIONS  (STANDING):  apixaban 5 milliGRAM(s) Oral every 12 hours  dextrose 5%. 1000 milliLiter(s) (50 mL/Hr) IV Continuous <Continuous>  dextrose 50% Injectable 12.5 Gram(s) IV Push once  dextrose 50% Injectable 25 Gram(s) IV Push once  dextrose 50% Injectable 25 Gram(s) IV Push once  docusate sodium 100 milliGRAM(s) Oral three times a day  insulin lispro (HumaLOG) corrective regimen sliding scale   SubCutaneous three times a day before meals  insulin lispro (HumaLOG) corrective regimen sliding scale   SubCutaneous at bedtime  levothyroxine 50 MICROGram(s) Oral daily  morphine ER Tablet 15 milliGRAM(s) Oral every 8 hours  senna 2 Tablet(s) Oral at bedtime  sodium chloride 0.9%. 1000 milliLiter(s) (75 mL/Hr) IV Continuous <Continuous>  tamsulosin 0.4 milliGRAM(s) Oral at bedtime    MEDICATIONS  (PRN):  bisacodyl 5 milliGRAM(s) Oral every 12 hours PRN Constipation  dextrose 40% Gel 15 Gram(s) Oral once PRN Blood Glucose LESS THAN 70 milliGRAM(s)/deciliter  glucagon  Injectable 1 milliGRAM(s) IntraMuscular once PRN Glucose LESS THAN 70 milligrams/deciliter  HYDROmorphone  Injectable 0.5 milliGRAM(s) IV Push every 4 hours PRN Moderate Pain (4 - 6)  HYDROmorphone  Injectable 1 milliGRAM(s) IV Push every 4 hours PRN Severe Pain (7 - 10)      PHYSICAL EXAM:  Vital Signs Last 24 Hrs  T(C): 36.9 (29 Jun 2018 13:53), Max: 37.1 (29 Jun 2018 05:15)  T(F): 98.4 (29 Jun 2018 13:53), Max: 98.8 (29 Jun 2018 05:15)  HR: 109 (29 Jun 2018 13:53) (102 - 109)  BP: 124/75 (29 Jun 2018 13:53) (119/68 - 128/71)  BP(mean): --  RR: 17 (29 Jun 2018 13:53) (17 - 18)  SpO2: 100% (29 Jun 2018 13:53) (98% - 100%)  I&O's Summary    28 Jun 2018 07:01  -  29 Jun 2018 07:00  --------------------------------------------------------  IN: 0 mL / OUT: 850 mL / NET: -850 mL      GENERAL: NAD, well-developed  HEAD:  Atraumatic, Normocephalic  EYES: EOMI, PERRLA, conjunctiva and sclera clear  NECK: Supple, No JVD  CHEST/LUNG: Clear to auscultation bilaterally; No wheeze  HEART: Regular rate and rhythm; No murmurs, rubs, or gallops  ABDOMEN: Soft, Nontender, Nondistended; Bowel sounds present  EXTREMITIES:  2+ Peripheral Pulses, No clubbing, cyanosis, or edema  PSYCH: AAOx3  NEUROLOGY: non-focal  SKIN: No rashes or lesions    LABS:  CAPILLARY BLOOD GLUCOSE      POCT Blood Glucose.: 120 mg/dL (29 Jun 2018 12:50)  POCT Blood Glucose.: 116 mg/dL (29 Jun 2018 08:46)  POCT Blood Glucose.: 157 mg/dL (28 Jun 2018 22:41)                          9.8    18.04 )-----------( 209      ( 29 Jun 2018 11:00 )             31.9     06-29    135  |  110<H>  |  40<H>  ----------------------------<  116<H>  5.3   |  14<L>  |  0.85    Ca    9.0      29 Jun 2018 11:00  Phos  2.8     06-29  Mg     2.3     06-29    TPro  5.8<L>  /  Alb  1.1<L>  /  TBili  1.2  /  DBili  x   /  AST  227<H>  /  ALT  166<H>  /  AlkPhos  292<H>  06-29              RADIOLOGY & ADDITIONAL TESTS:    Imaging Personally Reviewed:    Consultant(s) Notes Reviewed:      Care Discussed with Consultants/Other Providers:
Pt has been uncomfortable because of abdominal pain, dilaudid helps, denies any othe active symptoms except for severe fatigue.      Meds:  apixaban 5 milliGRAM(s) Oral every 12 hours  bisacodyl 5 milliGRAM(s) Oral every 12 hours PRN  dextrose 40% Gel 15 Gram(s) Oral once PRN  dextrose 5%. 1000 milliLiter(s) IV Continuous <Continuous>  dextrose 50% Injectable 12.5 Gram(s) IV Push once  dextrose 50% Injectable 25 Gram(s) IV Push once  dextrose 50% Injectable 25 Gram(s) IV Push once  docusate sodium 100 milliGRAM(s) Oral three times a day  glucagon  Injectable 1 milliGRAM(s) IntraMuscular once PRN  HYDROmorphone  Injectable 0.5 milliGRAM(s) IV Push every 4 hours PRN  HYDROmorphone  Injectable 1 milliGRAM(s) IV Push every 4 hours PRN  insulin lispro (HumaLOG) corrective regimen sliding scale   SubCutaneous three times a day before meals  insulin lispro (HumaLOG) corrective regimen sliding scale   SubCutaneous at bedtime  levothyroxine 50 MICROGram(s) Oral daily  morphine ER Tablet 15 milliGRAM(s) Oral two times a day  senna 2 Tablet(s) Oral at bedtime  sodium chloride 0.9%. 1000 milliLiter(s) IV Continuous <Continuous>  tamsulosin 0.4 milliGRAM(s) Oral at bedtime      Vital Signs Last 24 Hrs  T(C): 37.1 (29 Jun 2018 05:15), Max: 37.1 (28 Jun 2018 12:20)  T(F): 98.8 (29 Jun 2018 05:15), Max: 98.8 (29 Jun 2018 05:15)  HR: 108 (29 Jun 2018 08:16) (102 - 123)  BP: 120/67 (29 Jun 2018 08:16) (110/72 - 128/71)  BP(mean): --  RR: 18 (29 Jun 2018 05:15) (16 - 18)  SpO2: 99% (29 Jun 2018 05:15) (96% - 99%)                          11.7   16.92 )-----------( 245      ( 28 Jun 2018 06:16 )             38.4       06-28    139  |  102  |  46<H>  ----------------------------<  83  5.0   |  23  |  0.97    Ca    11.3<H>      28 Jun 2018 06:16    TPro  6.4  /  Alb  2.2<L>  /  TBili  1.2  /  DBili  0.5<H>  /  AST  212<H>  /  ALT  144<H>  /  AlkPhos  374<H>  06-28
Pt is not much changed clinically, sleepy but arousable. has pain abd at times. No fevers or chills, no appetite and has not eaten much. Son was here earlier but did not make any decisions.      Meds:  apixaban 5 milliGRAM(s) Oral every 12 hours  bisacodyl 5 milliGRAM(s) Oral every 12 hours PRN  dextrose 40% Gel 15 Gram(s) Oral once PRN  dextrose 5%. 1000 milliLiter(s) IV Continuous <Continuous>  dextrose 50% Injectable 12.5 Gram(s) IV Push once  dextrose 50% Injectable 25 Gram(s) IV Push once  dextrose 50% Injectable 25 Gram(s) IV Push once  docusate sodium 100 milliGRAM(s) Oral three times a day  glucagon  Injectable 1 milliGRAM(s) IntraMuscular once PRN  HYDROmorphone  Injectable 0.5 milliGRAM(s) IV Push every 4 hours PRN  HYDROmorphone  Injectable 1 milliGRAM(s) IV Push every 4 hours PRN  insulin lispro (HumaLOG) corrective regimen sliding scale   SubCutaneous three times a day before meals  insulin lispro (HumaLOG) corrective regimen sliding scale   SubCutaneous at bedtime  levothyroxine 50 MICROGram(s) Oral daily  morphine ER Tablet 15 milliGRAM(s) Oral two times a day  senna 2 Tablet(s) Oral at bedtime  sodium chloride 0.9%. 1000 milliLiter(s) IV Continuous <Continuous>  tamsulosin 0.4 milliGRAM(s) Oral at bedtime      Vital Signs Last 24 Hrs  T(C): 37.1 (28 Jun 2018 12:20), Max: 37.1 (27 Jun 2018 22:07)  T(F): 98.7 (28 Jun 2018 12:20), Max: 98.7 (27 Jun 2018 22:07)  HR: 123 (28 Jun 2018 12:20) (101 - 123)  BP: 110/72 (28 Jun 2018 12:20) (110/72 - 124/68)  BP(mean): --  RR: 16 (28 Jun 2018 12:20) (16 - 18)  SpO2: 96% (28 Jun 2018 12:20) (96% - 100%)                          11.7   16.92 )-----------( 245      ( 28 Jun 2018 06:16 )             38.4       06-28    139  |  102  |  46<H>  ----------------------------<  83  5.0   |  23  |  0.97    Ca    11.3<H>      28 Jun 2018 06:16  Phos  3.1     06-27  Mg     2.3     06-27    TPro  6.4  /  Alb  2.2<L>  /  TBili  1.2  /  DBili  0.5<H>  /  AST  212<H>  /  ALT  144<H>  /  AlkPhos  374<H>  06-28

## 2018-07-01 VITALS
HEART RATE: 105 BPM | OXYGEN SATURATION: 97 % | RESPIRATION RATE: 23 BRPM | TEMPERATURE: 99 F | SYSTOLIC BLOOD PRESSURE: 70 MMHG | DIASTOLIC BLOOD PRESSURE: 42 MMHG

## 2018-07-01 LAB
ALBUMIN SERPL ELPH-MCNC: 1.7 G/DL — LOW (ref 3.3–5)
ALP SERPL-CCNC: 297 U/L — HIGH (ref 40–120)
ALT FLD-CCNC: 477 U/L — HIGH (ref 4–41)
AST SERPL-CCNC: 886 U/L — HIGH (ref 4–40)
BILIRUB SERPL-MCNC: 1.3 MG/DL — HIGH (ref 0.2–1.2)
BUN SERPL-MCNC: 64 MG/DL — HIGH (ref 7–23)
CALCIUM SERPL-MCNC: 8.2 MG/DL — LOW (ref 8.4–10.5)
CHLORIDE SERPL-SCNC: 111 MMOL/L — HIGH (ref 98–107)
CO2 SERPL-SCNC: 17 MMOL/L — LOW (ref 22–31)
CREAT SERPL-MCNC: 2 MG/DL — HIGH (ref 0.5–1.3)
GLUCOSE SERPL-MCNC: 145 MG/DL — HIGH (ref 70–99)
HCT VFR BLD CALC: 28.1 % — LOW (ref 39–50)
HGB BLD-MCNC: 8.9 G/DL — LOW (ref 13–17)
MAGNESIUM SERPL-MCNC: 2.5 MG/DL — SIGNIFICANT CHANGE UP (ref 1.6–2.6)
MCHC RBC-ENTMCNC: 25.9 PG — LOW (ref 27–34)
MCHC RBC-ENTMCNC: 31.7 % — LOW (ref 32–36)
MCV RBC AUTO: 81.7 FL — SIGNIFICANT CHANGE UP (ref 80–100)
NRBC # FLD: 0.23 — SIGNIFICANT CHANGE UP
NRBC FLD-RTO: 2.3 — SIGNIFICANT CHANGE UP
PHOSPHATE SERPL-MCNC: 3.6 MG/DL — SIGNIFICANT CHANGE UP (ref 2.5–4.5)
PLATELET # BLD AUTO: 181 K/UL — SIGNIFICANT CHANGE UP (ref 150–400)
PMV BLD: 10.9 FL — SIGNIFICANT CHANGE UP (ref 7–13)
POTASSIUM SERPL-MCNC: 5.1 MMOL/L — SIGNIFICANT CHANGE UP (ref 3.5–5.3)
POTASSIUM SERPL-SCNC: 5.1 MMOL/L — SIGNIFICANT CHANGE UP (ref 3.5–5.3)
PROT SERPL-MCNC: 5.5 G/DL — LOW (ref 6–8.3)
RBC # BLD: 3.44 M/UL — LOW (ref 4.2–5.8)
RBC # FLD: 16 % — HIGH (ref 10.3–14.5)
SODIUM SERPL-SCNC: 141 MMOL/L — SIGNIFICANT CHANGE UP (ref 135–145)
WBC # BLD: 10.21 K/UL — SIGNIFICANT CHANGE UP (ref 3.8–10.5)
WBC # FLD AUTO: 10.21 K/UL — SIGNIFICANT CHANGE UP (ref 3.8–10.5)

## 2018-07-01 PROCEDURE — 71045 X-RAY EXAM CHEST 1 VIEW: CPT | Mod: 26

## 2018-07-01 RX ORDER — SODIUM CHLORIDE 9 MG/ML
250 INJECTION INTRAMUSCULAR; INTRAVENOUS; SUBCUTANEOUS ONCE
Qty: 0 | Refills: 0 | Status: COMPLETED | OUTPATIENT
Start: 2018-07-01 | End: 2018-07-01

## 2018-07-01 RX ORDER — HYDROMORPHONE HYDROCHLORIDE 2 MG/ML
0.5 INJECTION INTRAMUSCULAR; INTRAVENOUS; SUBCUTANEOUS EVERY 4 HOURS
Qty: 0 | Refills: 0 | Status: DISCONTINUED | OUTPATIENT
Start: 2018-07-01 | End: 2018-07-01

## 2018-07-01 RX ORDER — ACETAMINOPHEN 500 MG
650 TABLET ORAL EVERY 6 HOURS
Qty: 0 | Refills: 0 | Status: DISCONTINUED | OUTPATIENT
Start: 2018-07-01 | End: 2018-07-01

## 2018-07-01 RX ADMIN — SODIUM CHLORIDE 125 MILLILITER(S): 9 INJECTION INTRAMUSCULAR; INTRAVENOUS; SUBCUTANEOUS at 10:56

## 2018-07-01 RX ADMIN — Medication 50 MICROGRAM(S): at 06:57

## 2018-07-01 RX ADMIN — MORPHINE SULFATE 15 MILLIGRAM(S): 50 CAPSULE, EXTENDED RELEASE ORAL at 01:13

## 2018-07-01 RX ADMIN — Medication 100 MILLIGRAM(S): at 05:50

## 2018-07-01 RX ADMIN — SODIUM CHLORIDE 75 MILLILITER(S): 9 INJECTION INTRAMUSCULAR; INTRAVENOUS; SUBCUTANEOUS at 13:16

## 2018-07-01 RX ADMIN — APIXABAN 5 MILLIGRAM(S): 2.5 TABLET, FILM COATED ORAL at 05:50

## 2018-07-01 RX ADMIN — Medication 650 MILLIGRAM(S): at 08:38

## 2018-07-01 NOTE — PROVIDER CONTACT NOTE (OTHER) - ASSESSMENT
Pt spiked fever of 100.8  BP 93/51 RR 23 O2 94 on RA. Pt is tachypneic using accessory muscles. Lethargic

## 2018-07-01 NOTE — DISCHARGE NOTE FOR THE EXPIRED PATIENT - HOSPITAL COURSE
JUAN  -Suspect patient's JUAN is in setting of his poor PO intake   -IVF   -HOLD Ramipril      Failure to thrive in adult  -Likely in setting of his new metastatic adenocarcinoma    Transaminitis  -Likely 2/2 liver mets and dehydration from decreased PO intake  -HOLD statin    Leukocytosis  -WBC 17.9K  -No signs of infection, suspect it is likely hemoconcentration in setting of poor PO intake  -MONITOR off Abx     Adenocarcinoma   -Pt was to start chemotherapy as outpatient on 6/26 but could not make it to the appointment 2/2 weakness  -Onc Cx    -Genomic analysis sent by primary oncologist for possibility of immunotherapy (if MSI-high, very small % of pancreatic Ca)    Hypercalcemia   -6/27 - Zometa 4 mg x1    Type 2 diabetes mellitus   -Patient with episodes of hypoglycemia at home 2/2 decreased PO intake, his wife has been holding his insulin at home for the past few days  -ISS    BPH  -Flomax      Splenic vein thrombosis  -Eliquis     PPX measure  -Eliquis

## 2018-07-01 NOTE — CHART NOTE - NSCHARTNOTEFT_GEN_A_CORE
Spoke with family regarding pt status as pt is becoming increasingly lethargic with soft bp's. Per wife, she does not want chest compressions or intubation done on pt. Spoke with son Tej (health care proxy) over the phone who confirmed pt is DNR status, witness by fellow NP Pam Torres. DNR placed, DNR form filled out and awaiting attending signature. Attending aware of pt declining status. Will continue to closely monitor pt. Emotional support provided to family

## 2018-07-02 LAB — SPECIMEN SOURCE: SIGNIFICANT CHANGE UP

## 2018-07-06 LAB — BACTERIA BLD CULT: SIGNIFICANT CHANGE UP

## 2019-05-29 NOTE — PROGRESS NOTE ADULT - ATTENDING COMMENTS
Consent: The patient's consent was obtained including but not limited to risks of crusting, scabbing, blistering, scarring, darker or lighter pigmentary change, recurrence, incomplete removal and infection. Post-Care Instructions: I reviewed with the patient in detail post-care instructions. Patient is to wear sunprotection, and avoid picking at any of the treated lesions. Pt may apply Vaseline to crusted or scabbing areas. Price (Use Numbers Only, No Special Characters Or $): no charge Detail Level: Detailed Render Post-Care Instructions In Note?: no Pt seen and examined. s/p IR guided liver biopsy today. Tolerated procedure well.   [facility]- TTE reviewed EF 60%, mild diastolic dysfunction, no significant valvular abnormalities. No pulm HTN  CT PE- neg but notable for bibasilar atelectasis  3/20/18 NST without evidence of ischemia  PFTs Pt seen and examined. s/p IR guided liver biopsy today. Tolerated procedure well.   Pt had orthostatic hypotension given IVF. Will recheck and consider decreasing metoprolol dose  F/u pending liver biopsy results  Plan to resume AC post procedure  Dyspnea- Encourage incentive spirometer. Will review full PFTs. Other w/u so far has been negative    **OSH records from [place] reviewed- TTE reviewed EF 60%, mild diastolic dysfunction, no significant valvular abnormalities. No pulm HTN. CT PE- neg but notable for bibasilar atelectasis. 3/20/18 NST without evidence of ischemia  PFTs show FEV1 62% of predicted. Will need to review complete PFTs.**

## 2020-01-21 NOTE — H&P ADULT - PROBLEM SELECTOR PLAN 5
Yes
- GOC had with pt. He would like to undergo treatment for whatever cancer this maybe. He would also like to be full code at this time.   - Plan to undergo liver biopsy once electrolytes are normalized.  - Pain control with mophine ER and oxycodone at home doses with bowl regimen.

## 2022-12-02 NOTE — ED ADULT TRIAGE NOTE - ARRIVAL FROM
PA submitted VIA [facility] for  Livalo 2MG tablets  Key: [id number] - PA Case ID: [id number]    Request Reference Number: [id number]. LIVALO TAB 2MG is approved through 12/02/2023.  Your patient may now fill this prescription and it will be covered in house

## 2024-02-26 NOTE — PROGRESS NOTE ADULT - PROBLEM/PLAN-7
Patient has failed at least three months of conservative therapy including CSI and OTC medications , patient symptoms include pain with range of motion and ambulation, pain is rated at 8/10.  After discussing the options for treatment, the patient elected to proceed forward with Bilateral knee Monovisc injections to reduce pain. Risks of injection, including but not limited to, post-injection pain, swelling, pseudo septic reaction, skin rash, itching, and infection were discussed in detail.  The patient understood, had no further questions and elected to proceed forward.  After sterile preparation, the Monovisc injections were injected into the Bilateral knees, respectively.  The patient tolerated the procedure well no complications were noted.  The patient was instructed ice and elevate the extremity, limit strenuous activity for the next 2-3 days, and to contact us if there were any questions or concerns prior to their follow-up appointment.  I will see the patient back in 3 months for reevaluation of bilateral knees.        Large joint arthrocentesis: R knee  Universal Protocol:  Risks and benefits: risks, benefits and alternatives were discussed  Consent given by: patient  Patient identity confirmed: verbally with patient  Supporting Documentation  Indications: pain   Procedure Details  Location: knee - R knee  Needle size: 22 G  Medications administered: 88 mg hyaluronan 88 MG/4ML    Patient tolerance: patient tolerated the procedure well with no immediate complications  Dressing:  Sterile dressing applied      Large joint arthrocentesis: L knee  Universal Protocol:  Risks and benefits: risks, benefits and alternatives were discussed  Consent given by: patient  Patient identity confirmed: verbally with patient  Supporting Documentation  Indications: pain   Procedure Details  Location: knee - L knee  Needle size: 22 G  Approach: lateral  Medications administered: 88 mg hyaluronan 88 MG/4ML    Patient tolerance: 
patient tolerated the procedure well with no immediate complications  Dressing:  Sterile dressing applied          
DISPLAY PLAN FREE TEXT

## 2024-03-25 NOTE — PROGRESS NOTE ADULT - PROBLEM SELECTOR PLAN 7
I have personally seen and examined the patient. I have collaborated with and supervised the
- Patient with episodes of hypoglycemia at home 2/2 decreased PO intake, his wife has been holding his insulin at home for the past few days  - For now will place on HISS, monitor FS qAC

## 2025-05-23 NOTE — PROGRESS NOTE ADULT - PROBLEM SELECTOR PROBLEM 2
Outgoing call to pcp requesting referral for the up-coming appt.   
Failure to thrive in adult